# Patient Record
Sex: MALE | Race: BLACK OR AFRICAN AMERICAN | NOT HISPANIC OR LATINO | URBAN - METROPOLITAN AREA
[De-identification: names, ages, dates, MRNs, and addresses within clinical notes are randomized per-mention and may not be internally consistent; named-entity substitution may affect disease eponyms.]

---

## 2018-04-26 ENCOUNTER — EMERGENCY (EMERGENCY)
Facility: HOSPITAL | Age: 63
LOS: 0 days | Discharge: HOME | End: 2018-04-26
Attending: EMERGENCY MEDICINE
Payer: COMMERCIAL

## 2018-04-26 VITALS
DIASTOLIC BLOOD PRESSURE: 73 MMHG | TEMPERATURE: 98 F | SYSTOLIC BLOOD PRESSURE: 130 MMHG | HEART RATE: 93 BPM | OXYGEN SATURATION: 99 % | RESPIRATION RATE: 19 BRPM

## 2018-04-26 VITALS
HEART RATE: 72 BPM | OXYGEN SATURATION: 95 % | DIASTOLIC BLOOD PRESSURE: 55 MMHG | TEMPERATURE: 97 F | RESPIRATION RATE: 17 BRPM | SYSTOLIC BLOOD PRESSURE: 122 MMHG

## 2018-04-26 DIAGNOSIS — Z96.653 PRESENCE OF ARTIFICIAL KNEE JOINT, BILATERAL: Chronic | ICD-10-CM

## 2018-04-26 PROCEDURE — 99283 EMERGENCY DEPT VISIT LOW MDM: CPT

## 2018-04-26 PROCEDURE — 72100 X-RAY EXAM L-S SPINE 2/3 VWS: CPT | Mod: 26

## 2018-04-26 PROCEDURE — 73562 X-RAY EXAM OF KNEE 3: CPT | Mod: 26,LT

## 2018-04-26 RX ORDER — OXYCODONE AND ACETAMINOPHEN 5; 325 MG/1; MG/1
2 TABLET ORAL ONCE
Qty: 0 | Refills: 0 | Status: DISCONTINUED | OUTPATIENT
Start: 2018-04-26 | End: 2018-04-26

## 2018-04-26 RX ADMIN — OXYCODONE AND ACETAMINOPHEN 2 TABLET(S): 5; 325 TABLET ORAL at 19:02

## 2018-04-26 NOTE — ED PROVIDER NOTE - PHYSICAL EXAMINATION
Vitals: WNL  Gen: AAOx3, NAD, sitting comfortably in stretcher  Head: ncat, perrla, eomi b/l  Neck: supple, no lymphadenopathy, no midline deviation  Heart: rrr, no m/r/g  Lungs: CTA b/l, no rales/ronchi/wheezes  Abd: soft, nontender, non-distended, no rebound or guarding  Ext: no clubbing/cyanosis/edema, L knee appears grossly normal  Neuro: sensation and muscle strength intact b/l, steady gait

## 2018-04-26 NOTE — ED ADULT NURSE REASSESSMENT NOTE - NS ED NURSE REASSESS COMMENT FT1
Received report on patient from Lalo DAVIES,  knee pain persist 8/10, awaiting relief of pain following percocet
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
## 2018-04-26 NOTE — ED PROVIDER NOTE - PROGRESS NOTE DETAILS
Results reported to patient--grossly benign, XR's have no acute fx/injury  Pt. reports feeling better after meds, straight leg brace dispensed   pt. agrees to f/u with primary care outpt. as well as ortho  pt. understands to return to ED if symptoms worsen; will d/c
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
## 2018-04-27 DIAGNOSIS — M25.562 PAIN IN LEFT KNEE: ICD-10-CM

## 2018-04-27 DIAGNOSIS — M54.5 LOW BACK PAIN: ICD-10-CM

## 2018-04-27 DIAGNOSIS — Z96.652 PRESENCE OF LEFT ARTIFICIAL KNEE JOINT: ICD-10-CM

## 2018-04-27 DIAGNOSIS — M25.561 PAIN IN RIGHT KNEE: ICD-10-CM
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
## 2018-04-27 DIAGNOSIS — I10 ESSENTIAL (PRIMARY) HYPERTENSION: ICD-10-CM
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
## 2019-06-27 ENCOUNTER — RESULT REVIEW (OUTPATIENT)
Age: 64
End: 2019-06-27

## 2019-06-27 ENCOUNTER — OUTPATIENT (OUTPATIENT)
Dept: OUTPATIENT SERVICES | Facility: HOSPITAL | Age: 64
LOS: 1 days | Discharge: HOME | End: 2019-06-27

## 2019-06-27 VITALS
HEART RATE: 84 BPM | HEIGHT: 74 IN | SYSTOLIC BLOOD PRESSURE: 123 MMHG | WEIGHT: 199.96 LBS | OXYGEN SATURATION: 99 % | RESPIRATION RATE: 17 BRPM | TEMPERATURE: 97 F | DIASTOLIC BLOOD PRESSURE: 68 MMHG

## 2019-06-27 VITALS — DIASTOLIC BLOOD PRESSURE: 78 MMHG | HEART RATE: 72 BPM | SYSTOLIC BLOOD PRESSURE: 126 MMHG | RESPIRATION RATE: 16 BRPM

## 2019-06-27 DIAGNOSIS — Z96.653 PRESENCE OF ARTIFICIAL KNEE JOINT, BILATERAL: Chronic | ICD-10-CM

## 2019-06-27 RX ORDER — SODIUM CHLORIDE 9 MG/ML
1000 INJECTION, SOLUTION INTRAVENOUS
Refills: 0 | Status: DISCONTINUED | OUTPATIENT
Start: 2019-06-27 | End: 2019-06-27

## 2019-06-27 RX ADMIN — SODIUM CHLORIDE 75 MILLILITER(S): 9 INJECTION, SOLUTION INTRAVENOUS at 16:25

## 2019-06-27 NOTE — ASU DISCHARGE PLAN (ADULT/PEDIATRIC) - ASU DC SPECIAL INSTRUCTIONSFT
Please call Dr. Turpin's office at  to been seen in a follow up office visit two weeks after the completion of your procedure.    Resume all previous medications

## 2019-06-27 NOTE — ASU PATIENT PROFILE, ADULT - PMH
Chronic leg pain  multiple knee surg. bilateral knee replacements 05/ 2012  Hypertension    Insomnia

## 2019-07-01 LAB
SURGICAL PATHOLOGY STUDY: SIGNIFICANT CHANGE UP
SURGICAL PATHOLOGY STUDY: SIGNIFICANT CHANGE UP

## 2019-07-05 DIAGNOSIS — N18.9 CHRONIC KIDNEY DISEASE, UNSPECIFIED: ICD-10-CM

## 2019-07-05 DIAGNOSIS — Z86.010 PERSONAL HISTORY OF COLONIC POLYPS: ICD-10-CM

## 2019-07-05 DIAGNOSIS — K29.50 UNSPECIFIED CHRONIC GASTRITIS WITHOUT BLEEDING: ICD-10-CM

## 2019-07-05 DIAGNOSIS — R10.9 UNSPECIFIED ABDOMINAL PAIN: ICD-10-CM

## 2019-07-05 DIAGNOSIS — I12.9 HYPERTENSIVE CHRONIC KIDNEY DISEASE WITH STAGE 1 THROUGH STAGE 4 CHRONIC KIDNEY DISEASE, OR UNSPECIFIED CHRONIC KIDNEY DISEASE: ICD-10-CM

## 2019-07-05 DIAGNOSIS — Z12.11 ENCOUNTER FOR SCREENING FOR MALIGNANT NEOPLASM OF COLON: ICD-10-CM

## 2019-07-05 DIAGNOSIS — D12.3 BENIGN NEOPLASM OF TRANSVERSE COLON: ICD-10-CM

## 2019-07-05 DIAGNOSIS — K57.30 DIVERTICULOSIS OF LARGE INTESTINE WITHOUT PERFORATION OR ABSCESS WITHOUT BLEEDING: ICD-10-CM

## 2019-07-05 DIAGNOSIS — Z96.653 PRESENCE OF ARTIFICIAL KNEE JOINT, BILATERAL: ICD-10-CM

## 2019-07-05 DIAGNOSIS — J45.909 UNSPECIFIED ASTHMA, UNCOMPLICATED: ICD-10-CM

## 2019-09-27 ENCOUNTER — EMERGENCY (EMERGENCY)
Facility: HOSPITAL | Age: 64
LOS: 0 days | Discharge: HOME | End: 2019-09-28
Attending: EMERGENCY MEDICINE
Payer: MEDICARE

## 2019-09-27 VITALS
DIASTOLIC BLOOD PRESSURE: 68 MMHG | HEART RATE: 83 BPM | WEIGHT: 199.96 LBS | SYSTOLIC BLOOD PRESSURE: 151 MMHG | RESPIRATION RATE: 20 BRPM | HEIGHT: 74 IN | TEMPERATURE: 98 F | OXYGEN SATURATION: 98 %

## 2019-09-27 DIAGNOSIS — M25.50 PAIN IN UNSPECIFIED JOINT: ICD-10-CM

## 2019-09-27 DIAGNOSIS — M25.562 PAIN IN LEFT KNEE: ICD-10-CM

## 2019-09-27 DIAGNOSIS — I10 ESSENTIAL (PRIMARY) HYPERTENSION: ICD-10-CM

## 2019-09-27 DIAGNOSIS — Z96.653 PRESENCE OF ARTIFICIAL KNEE JOINT, BILATERAL: Chronic | ICD-10-CM

## 2019-09-27 DIAGNOSIS — Z96.651 PRESENCE OF RIGHT ARTIFICIAL KNEE JOINT: ICD-10-CM

## 2019-09-27 PROCEDURE — 99284 EMERGENCY DEPT VISIT MOD MDM: CPT

## 2019-09-27 RX ORDER — FLUTICASONE FUROATE, UMECLIDINIUM BROMIDE AND VILANTEROL TRIFENATATE 200; 62.5; 25 UG/1; UG/1; UG/1
1 POWDER RESPIRATORY (INHALATION)
Qty: 0 | Refills: 0 | DISCHARGE

## 2019-09-27 RX ORDER — VALSARTAN 80 MG/1
0 TABLET ORAL
Qty: 0 | Refills: 0 | DISCHARGE

## 2019-09-27 RX ORDER — AMLODIPINE BESYLATE 2.5 MG/1
1 TABLET ORAL
Qty: 0 | Refills: 0 | DISCHARGE

## 2019-09-27 NOTE — ED ADULT NURSE NOTE - NSIMPLEMENTINTERV_GEN_ALL_ED
Implemented All Universal Safety Interventions:  [place] to call system. Call bell, personal items and telephone within reach. Instruct patient to call for assistance. Room bathroom lighting operational. Non-slip footwear when patient is off stretcher. Physically safe environment: no spills, clutter or unnecessary equipment. Stretcher in lowest position, wheels locked, appropriate side rails in place.

## 2019-09-27 NOTE — ED ADULT TRIAGE NOTE - CHIEF COMPLAINT QUOTE
Pt BIBA, Pt pw L-knee pain. Pt stated he had a couple of knee replacement (3/6/19). tonight he was walking and he felt severe pain to knee.

## 2019-09-27 NOTE — ED ADULT NURSE NOTE - OBJECTIVE STATEMENT
65 y/o male PMHx COPD, HTN b/l Knee replacement x 5. Patient reports L- knee replacement at [facility] 3/6/19 states that he feels like something is "loose or dislocated", States that he felt like he was going to fall today due to the pain. Denies fevers. chills, recent falls

## 2019-09-28 VITALS
RESPIRATION RATE: 18 BRPM | SYSTOLIC BLOOD PRESSURE: 122 MMHG | TEMPERATURE: 98 F | DIASTOLIC BLOOD PRESSURE: 68 MMHG | HEART RATE: 68 BPM | OXYGEN SATURATION: 100 %

## 2019-09-28 PROCEDURE — 73562 X-RAY EXAM OF KNEE 3: CPT | Mod: 26,LT

## 2019-09-28 PROCEDURE — 73700 CT LOWER EXTREMITY W/O DYE: CPT | Mod: 26,LT

## 2019-09-28 RX ORDER — KETOROLAC TROMETHAMINE 30 MG/ML
60 SYRINGE (ML) INJECTION ONCE
Refills: 0 | Status: DISCONTINUED | OUTPATIENT
Start: 2019-09-28 | End: 2019-09-28

## 2019-09-28 RX ADMIN — Medication 60 MILLIGRAM(S): at 02:26

## 2019-09-28 NOTE — ED PROVIDER NOTE - PHYSICAL EXAMINATION
Gen: Alert, NAD  Head: NC, AT, EOMI, normal lids/conjunctiva  ENT: normal hearing, patent oropharynx, MMM  Neck: supple, no tenderness/meningismus, FROM, Trachea midline  Pulm: Bilateral clear BS, normal resp effort, no wheeze/stridor/retractions  CV: RRR, no M/R/G, +dist pulses  Abd: soft, NT/ND, +BS, no guarding/rebound tenderness  Mskel: no edema/erythema/cyanosis, +L knee swollen, pt able to flex & extend, DP+2, sensation intact, +crepitus, motor intact  Skin: no rash  Neuro: no sensory/motor deficits

## 2019-09-28 NOTE — ED PROVIDER NOTE - PATIENT PORTAL LINK FT
You can access the FollowMyHealth Patient Portal offered by [facility] by registering at the following website: [email or web address]. By joining [employer]’s FollowMyHealth portal, you will also be able to view your health information using other applications (apps) compatible with our system.

## 2019-09-28 NOTE — ED PROVIDER NOTE - CLINICAL SUMMARY MEDICAL DECISION MAKING FREE TEXT BOX
Pt w/o mult knee surgeries, no fx on imaging of L knee, noted effusion.  Placed in knee immobilizer, pt tolerated procedure well, remained NV intact.  Given crutches, pt familiar w use.  Dc home to follow up w PMD & ortho, instructed to return to ED if sx worsen.

## 2019-09-28 NOTE — ED PROVIDER NOTE - OBJECTIVE STATEMENT
Pertinent PMH/PSH/FHx/SHx and Review of Systems contained within:    63yo M w PMH of HTN, s/p knee replacement x3 in R knee, and x5 in L knee ([facility]) presents to ED c/o pain in L knee.  Pt states on 9/11/19 he noted a sharp pain in L knee, resolved w/o intervention.  Denies trauma, muscle weakness, paresthesias.  Pt states he was able to ambulate, then yesterday pt noted pain & "crunching" in L knee.  Also reports knee is more swollen.  Denies fever, chills, CP, SOB, abd pain.  Pt took his oxycodone at home w mild relief.    No fever/chills, No photophobia/eye pain/changes in vision, No ear pain/sore throat/dysphagia, No chest pain/palpitations, no SOB/cough/wheeze/stridor, No abdominal pain, No neck/back pain, no rash, no changes in neurological status/function.

## 2019-10-29 ENCOUNTER — APPOINTMENT (OUTPATIENT)
Dept: ORTHOPEDIC SURGERY | Facility: CLINIC | Age: 64
End: 2019-10-29
Payer: COMMERCIAL

## 2019-10-29 VITALS
DIASTOLIC BLOOD PRESSURE: 75 MMHG | BODY MASS INDEX: 25.67 KG/M2 | HEIGHT: 74 IN | HEART RATE: 101 BPM | SYSTOLIC BLOOD PRESSURE: 147 MMHG | WEIGHT: 200 LBS

## 2019-10-29 DIAGNOSIS — M17.10 UNILATERAL PRIMARY OSTEOARTHRITIS, UNSPECIFIED KNEE: ICD-10-CM

## 2019-10-29 DIAGNOSIS — M25.562 PAIN IN LEFT KNEE: ICD-10-CM

## 2019-10-29 PROCEDURE — 73564 X-RAY EXAM KNEE 4 OR MORE: CPT | Mod: LT

## 2019-10-29 PROCEDURE — 99204 OFFICE O/P NEW MOD 45 MIN: CPT

## 2019-10-29 NOTE — REVIEW OF SYSTEMS
[Arthralgia] : arthralgia [Joint Pain] : joint pain [Joint Swelling] : joint swelling [Negative] : Heme/Lymph

## 2019-10-29 NOTE — HISTORY OF PRESENT ILLNESS
[Worsening] : worsening [5] : a minimum pain level of 5/10 [Standing] : standing [Constant] : ~He/She~ states the symptoms seem to be constant [Walking] : worsened by walking [Recumbency] : relieved by recumbency [6] : a maximum pain level of 6/10 [de-identified] : Pt presents for initial visit with  pain in his left knee. Pt states his knee locks and sergio. Hx of TKR bilateral and revisions. Pt had  L TKR 2005, revision By Dr Tyshawn Parham 2009 then again revision 20012 by Dr Guadarrama then third revision  Left knee  3/6/19 by Dr Chiara BARU,  Pt is taking morphine for pain. Pt is using a crutch for support. Pt had courses of physical therapy after his revision 3/19. (R TKR [id number] plus a revision, pt does not recall the date.)  [de-identified] : topical cream and morphine

## 2019-10-29 NOTE — DISCUSSION/SUMMARY
[de-identified] : The patient was advised of his findings and shown his x-rays. The exact etiology of his current symptoms is unclear at this time. His last left knee revision surgery was March of 2019 and it is possible there is a significant degree of quadriceps weakness and deconditioning alone, however concerns for component loosening and possible infection exist. Patient was advised on following up with his index surgeon and undergo further investigational studies such as C. reactive protein sedimentation rate, white cell count as well as diagnostic knee aspiration for culture and sensitivity Gram stain, etc..

## 2019-10-29 NOTE — PHYSICAL EXAM
[de-identified] : The patient ambulates with a unilateral crutch. He has a  left-sided antalgic gait.\par \par Examination of the left knee discloses healed midline incision. There is mild effusion with warmth to the left knee. Patient is pointing to the anterior knee joint region where he is experiencing pain. There is no acute instability on varus valgus anterior or posterior stress. Crepitus is noted to the patellofemoral region. Range of motion is between °. No thigh or calf tenderness\par \par The patient is able to straight leg raise however there is significant quadriceps weakness against resistance\par \par No acute neurovascular deficits to the lower extremity [de-identified] : X-rays taken of the left knee including the thigh and calf disclose hinged long stem total knee implants. The patella appears subluxed. There is resected femoral bone to the cortical metaphyseal junction replaced with augments and a cemented stem. There appears to be lucent zones between the medial and lateral cement bone interface\par The tibial implant as a spline stem with metaphyseal bone with distal stem abutment to the lateral distal cortical bone which is thinning.

## 2019-11-01 NOTE — ED ADULT TRIAGE NOTE - BMI (KG/M2)
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    Please call patient at [phone]   25.7

## 2020-12-11 NOTE — ASU PATIENT PROFILE, ADULT - BRADEN SCORE (IF 18 OR LESS ACTIVATE SKIN INJURY RISK INCREASED GUIDELINE), MLM
Patient provided with discharge instructions and verbalized understanding; discharge paperwork provided, keypad not working (unable to obtain signature). Patient ambulatory out of department with steady gait. 19

## 2021-09-28 ENCOUNTER — OUTPATIENT (OUTPATIENT)
Dept: OUTPATIENT SERVICES | Facility: HOSPITAL | Age: 66
LOS: 1 days | Discharge: HOME | End: 2021-09-28
Payer: MEDICARE

## 2021-09-28 DIAGNOSIS — R60.9 EDEMA, UNSPECIFIED: ICD-10-CM

## 2021-09-28 DIAGNOSIS — Z96.653 PRESENCE OF ARTIFICIAL KNEE JOINT, BILATERAL: Chronic | ICD-10-CM

## 2021-09-28 PROCEDURE — 93306 TTE W/DOPPLER COMPLETE: CPT | Mod: 26

## 2022-05-13 NOTE — ED ADULT NURSE NOTE - GENITOURINARY WDL
CTA of the chest showed AAA -4.5cm -wlll monitor serial ultrasound    Bladder non-tender and non-distended. Urine clear yellow.

## 2022-08-27 ENCOUNTER — EMERGENCY (EMERGENCY)
Facility: HOSPITAL | Age: 67
LOS: 0 days | Discharge: HOME | End: 2022-08-27
Attending: STUDENT IN AN ORGANIZED HEALTH CARE EDUCATION/TRAINING PROGRAM

## 2022-08-27 VITALS
TEMPERATURE: 98 F | OXYGEN SATURATION: 98 % | HEIGHT: 74 IN | DIASTOLIC BLOOD PRESSURE: 63 MMHG | SYSTOLIC BLOOD PRESSURE: 128 MMHG | RESPIRATION RATE: 20 BRPM | WEIGHT: 199.96 LBS | HEART RATE: 94 BPM

## 2022-08-27 VITALS
HEART RATE: 73 BPM | SYSTOLIC BLOOD PRESSURE: 113 MMHG | OXYGEN SATURATION: 99 % | TEMPERATURE: 98 F | RESPIRATION RATE: 16 BRPM | DIASTOLIC BLOOD PRESSURE: 75 MMHG

## 2022-08-27 DIAGNOSIS — M25.511 PAIN IN RIGHT SHOULDER: ICD-10-CM

## 2022-08-27 DIAGNOSIS — Z96.653 PRESENCE OF ARTIFICIAL KNEE JOINT, BILATERAL: Chronic | ICD-10-CM

## 2022-08-27 DIAGNOSIS — M25.562 PAIN IN LEFT KNEE: ICD-10-CM

## 2022-08-27 DIAGNOSIS — I10 ESSENTIAL (PRIMARY) HYPERTENSION: ICD-10-CM

## 2022-08-27 DIAGNOSIS — G89.29 OTHER CHRONIC PAIN: ICD-10-CM

## 2022-08-27 DIAGNOSIS — Z96.651 PRESENCE OF RIGHT ARTIFICIAL KNEE JOINT: ICD-10-CM

## 2022-08-27 PROCEDURE — 73030 X-RAY EXAM OF SHOULDER: CPT | Mod: 26,RT

## 2022-08-27 PROCEDURE — 73590 X-RAY EXAM OF LOWER LEG: CPT | Mod: 26,LT

## 2022-08-27 PROCEDURE — 73552 X-RAY EXAM OF FEMUR 2/>: CPT | Mod: 26,LT

## 2022-08-27 PROCEDURE — 99284 EMERGENCY DEPT VISIT MOD MDM: CPT

## 2022-08-27 PROCEDURE — 73562 X-RAY EXAM OF KNEE 3: CPT | Mod: 26,LT

## 2022-08-27 RX ORDER — ACETAMINOPHEN 500 MG
1 TABLET ORAL
Qty: 42 | Refills: 0
Start: 2022-08-27 | End: 2022-09-09

## 2022-08-27 RX ORDER — ACETAMINOPHEN 500 MG
975 TABLET ORAL ONCE
Refills: 0 | Status: COMPLETED | OUTPATIENT
Start: 2022-08-27 | End: 2022-08-27

## 2022-08-27 RX ORDER — IBUPROFEN 200 MG
400 TABLET ORAL ONCE
Refills: 0 | Status: COMPLETED | OUTPATIENT
Start: 2022-08-27 | End: 2022-08-27

## 2022-08-27 RX ORDER — IBUPROFEN 200 MG
1 TABLET ORAL
Qty: 30 | Refills: 0
Start: 2022-08-27 | End: 2022-09-05

## 2022-08-27 RX ADMIN — Medication 975 MILLIGRAM(S): at 13:48

## 2022-08-27 RX ADMIN — Medication 400 MILLIGRAM(S): at 13:48

## 2022-08-27 NOTE — ED ADULT NURSE NOTE - OBJECTIVE STATEMENT
pt 68y/o male c/c of sharp "shooting" L knee pain, [id number], worsening. pt denies trauma/fall. AAOX4, ambulates with walker at baseline. hx of L knee replacement (dec 2019) and HTN.

## 2022-08-27 NOTE — ED ADULT NURSE NOTE - NSICDXPASTMEDICALHX_GEN_ALL_CORE_FT
PAST MEDICAL HISTORY:  Chronic leg pain multiple knee surg. bilateral knee replacements 05/ 2012    Hypertension     Insomnia

## 2022-08-27 NOTE — ED ADULT NURSE NOTE - NSIMPLEMENTINTERV_GEN_ALL_ED
Implemented All Fall Risk Interventions:  [place] to call system. Call bell, personal items and telephone within reach. Instruct patient to call for assistance. Room bathroom lighting operational. Non-slip footwear when patient is off stretcher. Physically safe environment: no spills, clutter or unnecessary equipment. Stretcher in lowest position, wheels locked, appropriate side rails in place. Provide visual cue, wrist band, yellow gown, etc. Monitor gait and stability. Monitor for mental status changes and reorient to person, place, and time. Review medications for side effects contributing to fall risk. Reinforce activity limits and safety measures with patient and family.

## 2022-08-27 NOTE — ED ADULT TRIAGE NOTE - CHIEF COMPLAINT QUOTE
Left knee pain  with difficulty walking s/p replacement in December 2021, right  upper arm pain x 2 months but getting worse since August.

## 2022-08-27 NOTE — ED PROVIDER NOTE - NSFOLLOWUPINSTRUCTIONS_ED_ALL_ED_FT
You may take tylenol and ibuprofen as needed for pain every 8 hours. Take the ibuprofen with food.     Return to the emergency room:  - you have severe pain  - cannot walk  - new numbness/tingling  - trouble breathing  - chest pain    Follow up with sports medicine within the few weeks.    Shoulder Pain  Many things can cause shoulder pain, including:    - An injury.  - Moving the arm in the same way again and again (overuse).  - Joint pain (arthritis).    Follow these instructions at home:  Take these actions to help with your pain:    Squeeze a soft ball or a foam pad as much as you can. This helps to prevent swelling. It also makes the arm stronger.  Take over-the-counter and prescription medicines only as told by your doctor.  If told, put ice on the area:    Put ice in a plastic bag.  Place a towel between your skin and the bag.  Leave the ice on for 20 minutes, 2–3 times per day. Stop putting on ice if it does not help with the pain.    If you were given a shoulder sling or immobilizer:    Wear it as told.  Remove it to shower or bathe.  Move your arm as little as possible.  Keep your hand moving. This helps prevent swelling.      Contact a doctor if:  Your pain gets worse.  Medicine does not help your pain.  You have new pain in your arm, hand, or fingers.  Get help right away if:  Your arm, hand, or fingers:    Tingle.  Are numb.  Are swollen.  Are painful.  Turn white or blue.    This information is not intended to replace advice given to you by your health care provider. Make sure you discuss any questions you have with your health care provider.

## 2022-08-27 NOTE — ED PROVIDER NOTE - CLINICAL SUMMARY MEDICAL DECISION MAKING FREE TEXT BOX
68 yo M w/ PMH HTN and PSH of bilateral knee replacement c/o 1 month of R shoulder pain and several months of left knee pain. Neurovascular exam normal. Afebrile and well-appearing.  R shoulder may be 2/2 rotator cuff pathology, bursitis, tendonitis. No fracture or dislocation noted on radiographs. Left knee pain is chronic, no acute fracture visualized on xray. After pain medications, pt's pain improved significantly and was able to ambulate without any difficulty.     Plan:  - XR R shoulder  - XR L knee; additional XR of left femur and tib-fib obtained as recommended by orthopedics  - ibuprofen and tylenol  - reassess  - referral to sports medicine for further follow up and to arrange physical therapy

## 2022-08-27 NOTE — ED PROVIDER NOTE - NSFOLLOWUPCLINICS_GEN_ALL_ED_FT
[facility]  Sports Medicine  [address] [place] [address]  Phone: [phone]  Fax:

## 2022-08-27 NOTE — ED PROVIDER NOTE - OBJECTIVE STATEMENT
66 yo M w/ PMH HTN and PSH of bilateral knee replacement c/o 1 month of R shoulder pain and several months of left knee pain. Denies any trauma or falls. Reports having stabbing R shoulder pain radiating to upper arm, worse with cross adduction, intermittent, moderate in severity over past month. Denies any numbness/tingling, weakness or swelling. Also reports having acute on chronic left knee pain and stiffness worse in extension. Normally ambulates with walker. Has tried taking oxycodone 10 mg with no relief. Denies fever/chills, CP, SOB, N/V,D, back pain, or recent illness.

## 2022-08-27 NOTE — ED PROVIDER NOTE - PHYSICAL EXAMINATION
Gen: NAD, WDWN, elderly male  HEENT: NC/AT, conjunctivae clear  CV: RRR, no M/R/G  Pulm: Speaking full sentences, CTAB, no wheezing, rales, or rhonci. Breathing unlabored  Abd: Soft, NT, ND. No rebound, guarding, rigidity. No CVAT.  MSK: R shoulder limited ROM and pain in adduction. +Pain and slight weakness with empty can test on the right. No erythema, swelling over R shoulder. SILT bilateral upper extremities. Radial pulse 2+. Bilateral surgical scars over knees. No erythema or swelling over knees. ROM limited in left knee extension. No TTP over left knee. No pain with internal/external rotation of bilateral hips. SILT BLE. DP 2+ bilaterally. No LE edema. No midline cervical, thoracic, or lumbar spine TTP.  Neuro: A+Ox3, MAEx4  Psych: Cooperative, normal affect

## 2022-08-27 NOTE — ED PROVIDER NOTE - PATIENT PORTAL LINK FT
You can access the FollowMyHealth Patient Portal offered by [facility] by registering at the following website: [email or web address]. By joining [employer]’s FollowMyHealth portal, you will also be able to view your health information using other applications (apps) compatible with our system.

## 2022-08-27 NOTE — ED PROVIDER NOTE - PROGRESS NOTE DETAILS
Reviewed XRay of left knee, tibia-fibula, and femur with orthopedic service who do not visualize any fracture. Patient reports improvement in pain.  Able to ambulate with walker without difficulty.  Was requesting a knee immobilizer, which was not available in the ED at the time.

## 2022-08-27 NOTE — ED ADULT NURSE NOTE - NSICDXPASTSURGICALHX_GEN_ALL_CORE_FT
PAST SURGICAL HISTORY:  S/P total knee arthroplasty, bilateral right knee three surg. left knee four surglast was  2019

## 2022-08-30 ENCOUNTER — APPOINTMENT (OUTPATIENT)
Dept: SPORTS MEDICINE | Facility: CLINIC | Age: 67
End: 2022-08-30

## 2022-08-30 DIAGNOSIS — M25.511 PAIN IN RIGHT SHOULDER: ICD-10-CM

## 2022-08-30 DIAGNOSIS — M75.101 UNSPECIFIED ROTATOR CUFF TEAR OR RUPTURE OF RIGHT SHOULDER, NOT SPECIFIED AS TRAUMATIC: ICD-10-CM

## 2022-08-30 DIAGNOSIS — M75.41 IMPINGEMENT SYNDROME OF RIGHT SHOULDER: ICD-10-CM

## 2022-08-30 DIAGNOSIS — G89.29 PAIN IN RIGHT SHOULDER: ICD-10-CM

## 2022-08-30 DIAGNOSIS — M89.9 DISORDER OF BONE, UNSPECIFIED: ICD-10-CM

## 2022-08-30 DIAGNOSIS — M79.601 PAIN IN RIGHT ARM: ICD-10-CM

## 2022-08-30 PROCEDURE — 99204 OFFICE O/P NEW MOD 45 MIN: CPT

## 2022-08-30 RX ORDER — MELOXICAM 7.5 MG/1
7.5 TABLET ORAL DAILY
Qty: 30 | Refills: 1 | Status: ACTIVE | COMMUNITY
Start: 2022-08-30 | End: 1900-01-01

## 2022-08-31 NOTE — PHYSICAL EXAM
[de-identified] : General Appearance: Well-nourished, well developed in no acute distress Orientation: Oriented to person, place and time. Lungs: nonlabored breathing, no stridor, no audible wheezing\par \par Shoulder Exam BL shoulder\par \par C spine: full painless ROM, negative spurlings\par Inspection/Palpation UE (R/L): No TTP bilaterally.\par Scapulothoracic motion: No scapular dyskinesis, though scapular compensation noted with abduction of right shoulder.  ROM: Active FF (R/L): 180 / 180, Passive FF (R/L): 180 / 180, Active Abduction (R/L): 170 / 170, Passive Abduction (R/L): 170 / 170, External Rotation at side (R/L): 60 / 60, Internal Rotation (R/L): T12/T12\par Abduction Strength (R/L): 3/5 / 5/5 ER strength (R/L): 5/5 / 5/5 IR strength (R/L): 5/5 / 5/5 Elbow flexion strength (R/L):4/5 / 5/5 Elbow Extension strength (R/L):4/5 / 5/5 Intrinsics of hand strength (R/L): 5/5 / 5/5\par Cross Arm Adduction (R/L): neg / neg\par Belly Press Test (R/L): neg /neg\par Lift off Test (R/L): post /neg Drop arm (R/L): neg/neg Neer Impingement Test (R/L): pos / neg\par Empty Can Test (R/L): pos / neg Hartmann Test (R/L): neg / neg\par [place] Test (R/L): neg / neg Speeds Test (R/L): pos / neg\par O’Panchito’s Test (R/L): neg / neg \par Sulcus Sign (R/L): neg /neg\par \par Mild tenderness with palpation of her right lateral lower humerus, just above lateral epicondyle\par \par Cervical Spine Exam\par Tenderness: no TTP along right/left paraspinals and no TTP in midline of cervical or thoracic spine. ROM: Lateral Flexion (R/L): 45/45, Rotation (R/L): 70/70, Forward Flexion (R/L): 45/45, Extension (R/L): 70/70 DTR UE (R/L): Biceps: (2+/2+); Triceps: (2+/2+)\par Clonus (R/L): - / -\par Sensation: Subjective normal median / ulnar / radial / axillary sensation bilaterally Vasculature: 2+ radial pulse bilaterally\par C5 (deltoids / lateral upper arm sensation)= 5/5 : normal C6 (biceps / lateral forearm sensation)= 5/5 : normal C7 (triceps / middle finger sensation)= 5/5 : 2+ : normal C8 (finger flexors / medial forearm sensation)= 5/5 : normal T1 (finger abductors / medial elbow ankle sensation)= 5/5 : 2+ : normal\par Spurling Test (R/L): neg/neg\par \par \par Elbow Exam (Bilateral)\par \par Inspection/Palpation UE (R/L): No TTP over medial, lateral epicondyle and olecranon, though pain just above right lateral epicondyle. No thenar or hypothenar atrophy \par Elbow ROM (R/L): Flexion 0-140/ 0-140, Supination (R/L): 80/80, Pronation (R/L): 80/80 Elbow Stability (R/L): no varus or valgus laxity bilaterally Elbow Flexion Strength (R/L): 3/5 / 5/5\par Elbow Extension Strength (R/L): 4/5 / 5/5 \par Wrist Extension Strength (R/L): 5/5 / 5/5\par Wrist Flexion Strength (R/L): 5/5 / 5/5 Intrinsics of Hand Strength (R/L): 5/5 / 5/5 Sensation: Subjective normal median, ulnar, radial and axillary sensation bilaterally\par Ulnar distribution numbness with elbow flexion for >30 seconds does not occur\par Tinel’s Sign (R/L): neg /neg\par Maudley’s Test (R/L): pos /neg (resisted middle digit extension)\par Golfer’s elbow Test (R/L): neg /neg (resisted elbow and wrist extension)\par Pain with passive extension of wrist and fingers (R/L): neg /neg Vasculature: 2+ radial pulse bilaterally UE Skin (R/L): no rashes or lesions bilaterally DTR UE (R/L): Biceps (2/2+), Triceps (2/2+) [de-identified] : Bedside US R elbow, R shoulder performed on 8/30/2022 in office:\par Elbow - cortical irregularity at lateral elbow joint line with no evidence of effusion. \par Shoulder -hypoechoic appearance which may be consistent with partial thickness articular tear seen at the supraspinatus. \par \par X-ray of right shoulder, right humerus, right elbow performed in office on 8/30/2022:\par Right shoulder: Mild degenerative changes of the glenohumeral joint.  No fracture or dislocation.\par Right humerus: Atypical appearance of the mid humerus over the lateral aspects with mild bowing of the cortex, and hypodensity noted in the adjacent bone marrow.  No acute fracture.\par Right elbow: Prominent medial epicondyle, no fat pad noted, no fracture or dislocation.\par

## 2022-08-31 NOTE — REVIEW OF SYSTEMS
[Arthralgia] : arthralgia [Fever] : no fever [Chills] : no chills [Feeling Tired] : no fatigue [Recent Weight Gain (___ Lbs)] : no recent ~M [unfilled] weight gain [SOB at rest] : no shortness of breath at rest [SOB on Exertion] : no shortness of breath on exertion [FreeTextEntry9] : right shoulder and right arm pain, progressive [FreeTextEntry5] : no CP [de-identified] : No numbness, tingling, weakness

## 2022-08-31 NOTE — HISTORY OF PRESENT ILLNESS
[de-identified] : 67M pmhx htn, L TKA p/w progressive right arm and shoulder pain for the past 3 months. cannot recall in inciting gesture, reports worsening pain and weakness with raising arm and reaching. Reports using crutches for past 12 years since using crutches, and recently switched to walker 2 wks ago. Has been taking chronic oxycodone he takes for left knee, states it is worth with usage, better at rest. Denies radiating pain, numbness, tingling, paresthesias, neck pain. reports most pain at right lateral arm with rest, worst with elbow flexion and extension. Reports TKA was done due to osteoarthritis, approx 12 yrs ago, had revision done 2-3 yrs ago due to issues with prosthesis.

## 2022-08-31 NOTE — DISCUSSION/SUMMARY
[de-identified] : Attending Attestation:\par \par I saw and evaluated the patient and was involved with and agree with the fellow's history, physical exam, and I personally documented the assessment and plan of care.\par \par ASSESSMENT: \par \par Right-hand-dominant patient with right upper extremity pain, atraumatic, present for 3 months without an inciting event.  \par No neck pain or pain with motion of C-spine; no radiation of symptoms past the mid-to–distal humerus. patient has pain worse with movement of shoulder and elbow.  Patient with right mid humeral pain that is noted both volar and dorsal.   \par \par Right shoulder pain:   X-ray with mild degenerative changes of glenohumeral joint.  Also consider rotator cuff impingement, bursitis, tendinopathy versus partial-thickness tears of supraspinatus and infraspinatus. \par \par Mid humerus pain: Abnormal appearance of mid humerus on x-ray, no systemic symptoms of F/C, weight loss, night sweats. But does have pain worse at night.  Concern for underlying bony malignancy, will obtain urgent MRI with and without contrast to further evaluate, also instructed to follow-up with PMD for further evaluation. Patient has pain over his dorsal compartment with extension against resistance, consistent with triceps tendinopathy/muscle strain.  Also has pain anteriorly with supination and speeds test, concerning for biceps tendinopathy/muscle belly strain. \par \par Lateral elbow pain: Consider lateral epicondylitis.\par \par Hx of L knee TKA with long-stem implant, several revision surgeries, last in 2019 by Dr. Eligio Arciniega ([facility] Orthopedist).  Patient referred back to Dr. Arciniega, but he is requesting for second opinion, he was referred to Dr. Pritchard.\par \par Will treat symptoms with conservative management with activity modification, analgesic medications, HEP/PT, and re-evaluate the patient to see if they would benefit from further diagnostic testing, or referral for injection therapy or surgical intervention.\par \par Clinical and radiographic findings discussed. Diagnosis, prognosis and treatment options reviewed. Patient verbalizes understanding and all questions answered.\par The patient adamant at this time about not having any type of surgical intervention. Wants only to continue with non-operative management. \par \par Patient adamant at this time about not having any type of Sx intervention. Wants only to continue with conservative approach.\par \par PLAN:\par Additional Testing:\par Further Intervention: Patient would like to continue with non-interventional measures\par Weight-bearing Status: WBAT\par Assistive Devices: None\par Therapy: HEP recommended and reviewed\par Health Management: BMI/Weight Management and physical activity level reviewed with the patient\par Home Modalities: RICE protocol for pain/swelling and home modalities reviewed with the patient\par Medications: OTC analgesics\par Orthotics: None\par Work Status: Return to work as tolerated\par Activity Status: Avoid aggravating and high impact activities\par Sports/Gym Status: Gradual return to full activities as tolerated / No gym or sports until cleared medically\par Follow-up: 1 to 2 weeks\par  \par Time Based Billing: \par Total time spent with patient:  50 minutes.\par More than 50% of today's visit was devoted to face-to-face counseling regarding the following:   education and discussion about the diagnosis, prognosis, treatment options, and possible complications of treatments.\par Discussed treatment options.\par Discussed risks and benefits of treatment.\par Instructions covering recommended treatment.\par Detection of adverse events and importance of compliance.\par Discussed prognosis.\par Specific topics discussed included, but were not limited to the following: \par \par Parish Shaffer MD

## 2022-09-13 ENCOUNTER — APPOINTMENT (OUTPATIENT)
Dept: SPORTS MEDICINE | Facility: CLINIC | Age: 67
End: 2022-09-13

## 2022-09-13 DIAGNOSIS — M75.51 BURSITIS OF RIGHT SHOULDER: ICD-10-CM

## 2022-09-13 DIAGNOSIS — M19.019 PRIMARY OSTEOARTHRITIS, UNSPECIFIED SHOULDER: ICD-10-CM

## 2022-09-13 DIAGNOSIS — M75.111 INCOMPLETE ROTATOR CUFF TEAR OR RUPTURE OF RIGHT SHOULDER, NOT SPECIFIED AS TRAUMATIC: ICD-10-CM

## 2022-09-13 PROCEDURE — 99214 OFFICE O/P EST MOD 30 MIN: CPT

## 2022-09-13 NOTE — DISCUSSION/SUMMARY
[de-identified] : Attending Attestation:\par \par Patient seen with Dr. Light.\par I saw and evaluated the patient and was involved with and agree with the fellow's history, physical exam, and I personally documented the assessment and plan of care.\par \par ASSESSMENT: \par \par Right-hand-dominant patient with right upper extremity pain, atraumatic, present for last several months without an inciting event.  \par No neck pain or pain with motion of C-spine; patient has pain worse with movement of shoulder and elbow.  \par \par Right shoulder pain: Concerning for partial-thickness tears of supraspinatus and infraspinatus as well as degenerative changes to the GHJ and ACJ, and degenerative labral tears, and subacromial bursitis.  We will start with HEP and PT and reassess, if not improving consider ultrasound-guided corticosteroid injection versus Prolo/PRP.\par \par Mid humerus pain: Abnormal appearance of mid humerus on x-ray, no systemic symptoms of F/C, weight loss, night sweats. But does have pain worse at night.  Obtained MRI to rule out malignancy, no suspicious enhancement found. Patient has pain over his dorsal compartment with extension against resistance, consistent with triceps tendinopathy/muscle strain as well as pain at the deltoid insertion site.  Also has pain anteriorly with supination and speeds test, concerning for biceps tendinopathy/muscle belly strain. \par \par Lateral elbow pain: Consider lateral epicondylitis.  Recommended HEP, and if not improving plan for formal PT after treatment of right shoulder.  If not improving after this, consider CSI versus PRP injection.\par \par Hx of L knee TKA with long-stem implant, several revision surgeries, causing disability and patient requiring use of walker and crutches, last in 2019 by Dr. Eligio Arciniega ([facility] Orthopedist).  Patient referred back to Dr. Arciniega, but he is requesting for second opinion, he was referred to Dr. Pritchard, has appointment in 1 week.\par \par Will treat symptoms with conservative management with activity modification, analgesic medications, HEP/PT, and re-evaluate the patient to see if they would benefit from further diagnostic testing, or referral for injection therapy or surgical intervention.\par \par Clinical and radiographic findings discussed. Diagnosis, prognosis and treatment options reviewed. Patient verbalizes understanding and all questions answered.\par The patient adamant at this time about not having any type of surgical intervention. Wants only to continue with non-operative management. \par \par Patient adamant at this time about not having any type of Sx intervention. Wants only to continue with conservative approach.\par \par PLAN:\par Additional Testing: none\par Further Intervention: Patient would like to continue with non-interventional measures\par Weight-bearing Status: WBAT\par Assistive Devices: None\par Therapy: HEP recommended and reviewed, PT prescribed\par Health Management: BMI/Weight Management and physical activity level reviewed with the patient\par Home Modalities: RICE protocol for pain/swelling and home modalities reviewed with the patient\par Medications: OTC analgesics\par Orthotics: None\par Work Status: Return to work as tolerated\par Activity Status: Avoid aggravating and high impact activities\par Sports/Gym Status: No gym or sports until cleared medically\par Follow-up: 6 weeks\par  \par Time Based Billing: \par Total time spent with patient:  35 minutes.\par More than 50% of today's visit was devoted to face-to-face counseling regarding the following:   education and discussion about the diagnosis, prognosis, treatment options, and possible complications of treatments.\par Discussed treatment options.\par Discussed risks and benefits of treatment.\par Instructions covering recommended treatment.\par Detection of adverse events and importance of compliance.\par Discussed prognosis.\par \par Parish Shaffer MD

## 2022-09-13 NOTE — REVIEW OF SYSTEMS
[Arthralgia] : arthralgia [Fever] : no fever [Chills] : no chills [Feeling Tired] : no fatigue [Recent Weight Gain (___ Lbs)] : no recent ~M [unfilled] weight gain [SOB at rest] : no shortness of breath at rest [SOB on Exertion] : no shortness of breath on exertion [FreeTextEntry9] : right shoulder and right arm pain, progressive [FreeTextEntry5] : no CP [de-identified] : No numbness, tingling, weakness

## 2022-09-13 NOTE — HISTORY OF PRESENT ILLNESS
[de-identified] : 67M pmhx htn, L TKA p/w progressive right arm and shoulder pain for the past several months. cannot recall in inciting events, reports worsening pain and weakness with raising arm and reaching. Reports using crutches for past 12 years since using crutches, and recently switched to walker 2 wks ago. Has been taking chronic oxycodone he takes for left knee, states it is worse with use, better at rest. Denies radiating pain, numbness, tingling, paresthesias, neck pain. reports most pain at right lateral arm with rest, worst with elbow flexion and extension.  Left TKA approx 12 yrs ago, had revision done 2-3 yrs ago due to issues with prosthesis. \par \par Since the last visit, patient has had no significant improvement in right shoulder pain.  It is mild in severity at this time.

## 2022-09-13 NOTE — PHYSICAL EXAM
[de-identified] : General Appearance: Well-nourished, well developed in no acute distress Orientation: Oriented to person, place and time. Lungs: nonlabored breathing, no stridor, no audible wheezing\par \par Shoulder Exam BL shoulder\par Inspection/Palpation UE (R/L): No TTP bilaterally.\par Scapulothoracic motion: No scapular dyskinesis, though scapular compensation noted with abduction of right shoulder.  ROM: Active FF (R/L): 170 / 180, Passive FF (R/L): 170 / 180, Active Abduction (R/L): 160 / 170, Passive Abduction (R/L): 170 / 170, External Rotation at side (R/L): 60 / 60, Internal Rotation (R/L): T12/T12\par Abduction Strength (R/L): 4+/5 / 5/5 ER strength (R/L): 4+/5 / 5/5 IR strength (R/L): 5/5 / 5/5 Elbow flexion strength (R/L):5/5 / 5/5 Elbow Extension strength (R/L):5/5 / 5/5 Intrinsics of hand strength (R/L): 5/5 / 5/5\par Cross Arm Adduction (R/L): positive / neg\par Belly Press Test (R/L): neg /neg\par Lift off Test (R/L): postiving /neg Drop arm (R/L): neg/neg Neer Impingement Test (R/L): pos / neg\par Empty Can Test (R/L): pos / neg Hartmann Test (R/L): pos / neg\par Yergusons Test (R/L): neg / neg Speeds Test (R/L): neg / neg\par O’Panchito’s Test (R/L): pos / neg \par Sulcus Sign (R/L): neg /neg\par \par Cervical Spine Exam\par Tenderness: no TTP along right/left paraspinals and no TTP in midline of cervical or thoracic spine. ROM: Lateral Flexion (R/L): 45/45, Rotation (R/L): 70/70, Forward Flexion (R/L): 45/45, Extension (R/L): 70/70 DTR UE (R/L): Biceps: (2+/2+); Triceps: (2+/2+)\par Clonus (R/L): - / -\par Sensation: Subjective normal median / ulnar / radial / axillary sensation bilaterally Vasculature: 2+ radial pulse bilaterally\par C5 (deltoids / lateral upper arm sensation)= 5/5 : normal C6 (biceps / lateral forearm sensation)= 5/5 : normal C7 (triceps / middle finger sensation)= 5/5 : 2+ : normal C8 (finger flexors / medial forearm sensation)= 5/5 : normal T1 (finger abductors / medial elbow ankle sensation)= 5/5 : 2+ : normal\par Spurling Test (R/L): neg/neg\par \par \par Elbow Exam (Bilateral)\par Inspection/Palpation UE (R/L): No TTP over medial, lateral epicondyle and olecranon, though pain just above right lateral epicondyle. No thenar or hypothenar atrophy \par Elbow ROM (R/L): Flexion 0-140/ 0-140, Supination (R/L): 80/80, Pronation (R/L): 80/80 Elbow Stability (R/L): no varus or valgus laxity bilaterally \par Ulnar distribution numbness with elbow flexion for >30 seconds does not occur\par Tinel’s Sign (R/L): neg /neg\par Maudley’s Test (R/L): pos /neg (resisted middle digit extension)\par Golfer’s elbow Test (R/L): neg /neg (resisted elbow and wrist extension)\par Pain with passive extension of wrist and fingers (R/L): neg /neg Vasculature: 2+ radial pulse bilaterally UE Skin (R/L): no rashes or lesions bilaterally DTR UE (R/L): Biceps (2/2+), Triceps (2/2+) [de-identified] : MRI of right shoulder and humerus with and without contrast, performed on 9/8 and 9/9/2022 at [facility] radiology:\par Unremarkable MRI of the humerus. No suspicious lesions to suggest the presence of malignancy.\par Partial-thickness tearing of supraspinatus and infraspinatus tendons. Mild fatty infiltration of supraspinatus and infraspinatus muscles. Adjacent degenerative changes at the greater tuberosity.\par Degenerative tearing of the anterior and posterior labrum.\par Degenerative changes of the glenohumeral and acromioclavicular joints.\par Mild subacromial and subdeltoid bursitis. \par No suspicious enhancement to suggest presence of malignancy.\par \par Bedside US R elbow, R shoulder performed on 8/30/2022 in office:\par Elbow - cortical irregularity at lateral elbow joint line with no evidence of effusion. \par Shoulder -hypoechoic appearance which may be consistent with partial thickness articular tear seen at the supraspinatus. \par \par X-ray of right shoulder, right humerus, right elbow performed in office on 8/30/2022:\par Right shoulder: Mild degenerative changes of the glenohumeral joint.  No fracture or dislocation.\par Right humerus: Atypical appearance of the mid humerus over the lateral aspects with mild bowing of the cortex, and hypodensity noted in the adjacent bone marrow.  No acute fracture.\par Right elbow: Prominent medial epicondyle, no fat pad noted, no fracture or dislocation.\par

## 2022-09-20 ENCOUNTER — APPOINTMENT (OUTPATIENT)
Dept: ORTHOPEDIC SURGERY | Facility: CLINIC | Age: 67
End: 2022-09-20

## 2022-09-20 VITALS
HEIGHT: 74 IN | BODY MASS INDEX: 25.67 KG/M2 | HEART RATE: 109 BPM | SYSTOLIC BLOOD PRESSURE: 144 MMHG | DIASTOLIC BLOOD PRESSURE: 73 MMHG | WEIGHT: 200 LBS

## 2022-09-20 PROCEDURE — 99215 OFFICE O/P EST HI 40 MIN: CPT

## 2022-09-20 NOTE — DISCUSSION/SUMMARY
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  I had a prolonged session with the patient regarding the fact that his extensor mechanism is not functional and this particular since he has been having his pump for more than 3 years it is likely that it will be unable to be reconstructed without extensive surgery.  He has not tried a drop lock knee brace yet so I prescribed this for him to see if this will help restore his function for what his level activities desired.  He asks about the option of fusion versus amputation we discussed these options as well.  He should provide me with infection data to show me that the infection is ruled out otherwise we may need to do that.  He will consider his options but for now he will try droplet knee brace.  Several questions sought and answered.

## 2022-09-20 NOTE — PHYSICAL EXAM
[de-identified] : Patient is well nourished, well-developed, in no acute distress, with appropriate mood and affect. The patient is oriented to time, place, and person. Respirations are even and unlabored. Gait evaluation does reveal a limp. There is no inguinal adenopathy. Examination of the contralateral knee shows normal range of motion, strength, no tenderness, and intact skin. The operative limb is well-perfused, has a well appearing surgical scar, and shows a grossly normal motor and sensory examination.  Unable to straight leg raise.  80 degree extensor lag.  Knee motion is painless passively and the [SIDE] knee moves from  passively degrees.  Actively he can only flex the knee to approximate 110 from 90 degrees resting posture.  The knee is stable within that range-of-motion to AP and ML stress. The alignment of the knee is neutral. Muscle strength is normal. Quadriceps and hamstring muscle strength is normal bilaterally. Pedal pulses are palpable.  [de-identified] : The patient brings with him an AP and lateral graphs of the left femur, left knee and left tibia and fibula.  A longstem cemented hinged is noted with radiolucent lines about the femoral component but old radiographs are unavailable for comparison.  There is also patella bone osteolysis and flattening of the patella.

## 2022-09-20 NOTE — HISTORY OF PRESENT ILLNESS
[de-identified] : This is very nice 67-year-old gentleman experiencing left knee pain, which is severe in intensity. The pain substantially limits activities of daily living. Walking tolerance is reduced.  He is a complex medical history.  He underwent a left total knee arthroplasty by surgeon 2005.  This was complicated by some type of failure and underwent 4-5 more revisions since then at [facility].  The last revision was complicated by infection and now status post irrigation debridement January 24, 2020.  He states that the infection was marked up again and is no longer present.  He has not bring the data with him today.  No fevers or chills.  Since his last revision surgery has been unable to extend the knee.  Reviewing his records he has been having quadriceps weakness and unable to extend the knee since 2019.  Ambulates with a walker.  Chronically takes oxycodone.

## 2022-09-20 NOTE — REASON FOR VISIT
[Initial Visit] : an initial visit for [Artificial Knee Joint] : an artificial knee joint [Knee Pain] : knee pain

## 2022-10-18 ENCOUNTER — APPOINTMENT (OUTPATIENT)
Dept: SPORTS MEDICINE | Facility: CLINIC | Age: 67
End: 2022-10-18

## 2022-10-18 PROCEDURE — 20610 DRAIN/INJ JOINT/BURSA W/O US: CPT

## 2022-10-18 PROCEDURE — 99214 OFFICE O/P EST MOD 30 MIN: CPT | Mod: 25

## 2022-10-18 NOTE — PHYSICAL EXAM
[de-identified] : General Appearance: Well-nourished, well developed in no acute distress \par Orientation: Oriented to person, place and time. \par Lungs: nonlabored breathing, no stridor, no audible wheezing\par \par Shoulder Exam BL shoulder\par Inspection/Palpation UE (R/L): TTP to right AC.  and mild ttp to lateral right shoulder. No TTP to left shoulder. \par Scapulothoracic motion: No scapular dyskinesis  \par ROM: Active FF (R/L): 180 / 180, Passive FF (R/L): 180 / 180, Active Abduction (R/L): 170 / 170, Passive Abduction (R/L): 170 / 170, External Rotation at side (R/L): 60 / 60, Internal Rotation (R/L): T12/T12\par Abduction Strength (R/L): 4+/5 / 5/5 ER strength (R/L): 4+/5 / 5/5 IR strength (R/L): 5/5 / 5/5 Elbow flexion strength (R/L):5/5 / 5/5 Elbow Extension strength (R/L):5/5 / 5/5 Intrinsics of hand strength (R/L): 5/5 / 5/5\par Cross Arm Adduction (R/L): positive / neg\par Belly Press Test (R/L): neg /neg\par Lift off Test (R/L): neg /neg \par Drop arm (R/L): neg/neg \par Neer Impingement Test (R/L): neg / neg\par Empty Can Test (R/L): pos / neg Hartmann Test (R/L): neg / neg\par [place]ergusons Test (R/L): neg / neg Speeds Test (R/L): neg / neg\par O’Panchito’s Test (R/L): neg / neg \par Sulcus Sign (R/L): neg /neg\par \par Cervical Spine Exam\par Tenderness: no TTP along right/left paraspinals and no TTP in midline of cervical or thoracic spine. ROM: Lateral Flexion (R/L): 45/45, Rotation (R/L): 70/70, Forward Flexion (R/L): 45/45, Extension (R/L): 70/70 DTR UE (R/L): Biceps: (2+/2+); Triceps: (2+/2+)\par Clonus (R/L): - / -\par Sensation: Subjective normal median / ulnar / radial / axillary sensation bilaterally Vasculature: 2+ radial pulse bilaterally\par C5 (deltoids / lateral upper arm sensation)= 5/5 : normal C6 (biceps / lateral forearm sensation)= 5/5 : normal C7 (triceps / middle finger sensation)= 5/5 : 2+ : normal C8 (finger flexors / medial forearm sensation)= 5/5 : normal T1 (finger abductors / medial elbow ankle sensation)= 5/5 : 2+ : normal\par Spurling Test (R/L): neg/neg\par \par \par Elbow Exam (Bilateral)\par Inspection/Palpation UE (R/L): No TTP over medial, lateral epicondyle and olecranon, though pain just above right lateral epicondyle. No thenar or hypothenar atrophy \par Elbow ROM (R/L): Flexion 0-140/ 0-140, Supination (R/L): 80/80, Pronation (R/L): 80/80 Elbow Stability (R/L): no varus or valgus laxity bilaterally \par Ulnar distribution numbness with elbow flexion for >30 seconds does not occur\par Tinel’s Sign (R/L): neg /neg\par Maudley’s Test (R/L): pos /neg (resisted middle digit extension)\par Golfer’s elbow Test (R/L): neg /neg (resisted elbow and wrist extension)\par Pain with passive extension of wrist and fingers (R/L): neg /neg Vasculature: 2+ radial pulse bilaterally UE Skin (R/L): no rashes or lesions bilaterally DTR UE (R/L): Biceps (2/2+), Triceps (2/2+) [de-identified] : MRI of right shoulder and humerus with and without contrast, performed on 9/8 and 9/9/2022 at [facility] radiology:\par Unremarkable MRI of the humerus. No suspicious lesions to suggest the presence of malignancy.\par Partial-thickness tearing of supraspinatus and infraspinatus tendons. Mild fatty infiltration of supraspinatus and infraspinatus muscles. Adjacent degenerative changes at the greater tuberosity.\par Degenerative tearing of the anterior and posterior labrum.\par Degenerative changes of the glenohumeral and acromioclavicular joints.\par Mild subacromial and subdeltoid bursitis. \par No suspicious enhancement to suggest presence of malignancy.\par \par Bedside US R elbow, R shoulder performed on 8/30/2022 in office:\par Elbow - cortical irregularity at lateral elbow joint line with no evidence of effusion. \par Shoulder -hypoechoic appearance which may be consistent with partial thickness articular tear seen at the supraspinatus. \par \par X-ray of right shoulder, right humerus, right elbow performed in office on 8/30/2022:\par Right shoulder: Mild degenerative changes of the glenohumeral joint.  No fracture or dislocation.\par Right humerus: Atypical appearance of the mid humerus over the lateral aspects with mild bowing of the cortex, and hypodensity noted in the adjacent bone marrow.  No acute fracture.\par Right elbow: Prominent medial epicondyle, no fat pad noted, no fracture or dislocation.\par

## 2022-10-18 NOTE — REVIEW OF SYSTEMS
[Arthralgia] : arthralgia [Fever] : no fever [Chills] : no chills [Feeling Tired] : no fatigue [Recent Weight Gain (___ Lbs)] : no recent ~M [unfilled] weight gain [SOB at rest] : no shortness of breath at rest [SOB on Exertion] : no shortness of breath on exertion [FreeTextEntry9] : right shoulder and right arm pain, progressive [FreeTextEntry5] : no CP [de-identified] : No numbness, tingling, weakness

## 2022-10-18 NOTE — DISCUSSION/SUMMARY
[de-identified] : Attending Attestation:\par \par Seen with Dr. Light\par I saw and evaluated the patient and was involved with and agree with the fellow's history, physical exam, and I personally documented the assessment and plan of care.\par \par Right anterior and lateral shoulder pain that has been chronic, atraumatic.  Concerning for osteoarthritis involving the glenohumeral and AC joint.  Also has a component of SASD bursitis and rotator cuff tendinopathy with partial-thickness tears.  Patient with persistent pain despite conservative therapy, would prefer to have CSI performed in office today.\par \par Physical exam suggesting patient has AC joint pain chronic arthritis, ultrasound showed chronic tears in the patient's rotator cuff muscles,.  Injections discussed with patient into the AC joint as well as the glenohumeral joint and the SASD bursa, patient agreed, was provided with injections with reported relief of symptoms.  Was advised to return in 4 to 6 weeks for reassessment patient agreed to continue with his PT and was discharged stable condition.\par \par PLAN:\par Additional Testing: none\par Further Intervention: Patient would like to continue with non-operative interventions including CSI\par Weight-bearing Status: WBAT\par Assistive Devices: walker\par Therapy: HEP recommended and reviewed, PT prescribed\par Health Management: BMI/Weight Management and physical activity level reviewed with the patient\par Home Modalities: RICE protocol for pain/swelling and home modalities reviewed with the patient\par Medications: OTC analgesics\par Orthotics: None\par Work Status: Return to work as tolerated\par Activity Status: Avoid aggravating and high impact activities\par Sports/Gym Status: No gym or sports until cleared medically\par Follow-up: 6 weeks\par  \par Time Based Billing: \par Total time spent with patient:  35 minutes.\par More than 50% of today's visit was devoted to face-to-face counseling regarding the following:   education and discussion about the diagnosis, prognosis, treatment options, and possible complications of treatments.\par Discussed treatment options.\par Discussed risks and benefits of treatment.\par Instructions covering recommended treatment.\par Detection of adverse events and importance of compliance.\par Discussed prognosis.\par  \par Parish Shaffer MD

## 2022-10-18 NOTE — PROCEDURE
[Injection] : Injection [Right] : of the right [AC Joint] : acromioclavicular joint [Subacromial Bursa] : subacromial bursa [GH Joint] : glenohumeral joint [Joint Pain] : joint pain [Osteoarthritis] : osteoarthritis [Bleeding] : bleeding [Infection] : infection [Allergic Reaction] : allergic reaction [Patient] : patient [Risk] : risk [Benefits] : benefits [Alternatives] : alternatives [Verbal Consent Obtained] : verbal consent was obtained prior to the procedure [Alcohol] : alcohol [Betadine] : betadine [Ethyl Chloride Spray] : ethyl chloride spray was used as a topical anesthetic [Ultrasound Guided] : The procedure was ultrasound guided.   [Bandage Applied] : a bandage [Tolerated Well] : The patient tolerated the procedure well [None] : none [de-identified] : US right shoulder: anechoic areas seen in rotator cuff beds suggesting possible tears. AC joint showing osteoarthritic changes. \par \par Steroid injection of the right AC joint, right glenohumeral joint and right SASD bursa: \par \par After prepping specified areas with alcohol and iodine, lidocaine 1% w/o epi mixed with depo-medrol was injected into said areas in the following mixtures:\par \par AC - 1cc lidocaine, .3cc depomedrol\par GHJ - 3.5 cc lidocaine, .3cc depomedrol\par SASD - 2.5 cc lidocaine, .3cc depo-medrol\par \par Pt tolerated the procedure well.      \par

## 2022-10-18 NOTE — HISTORY OF PRESENT ILLNESS
[de-identified] : 67M pmhx htn, L TKA p/w progressive right arm and shoulder pain for the past several months coming in for a return visit for reevaluation of right arm and shoulder following several weeks of PT.  Patient reports that he has had significant improvements in pain and range of motion of right shoulder since beginning PT, though he reports that he still has some remnant anterior shoulder pain.  Denies any continued pain in the humerus as he mentioned last night.  Denies any numbness, tingling, paresthesias.  Has been taking his chronic Percocet for his leg pain.  Is currently seeing Dr. Pritchard and his prior Orthopedist for his left knee issues.  Has not been taking any Mobic or any other pain medications.  He states that the shoulder pain is worse when he lies on that side when he sleeps.\par \par Since the last visit, patient has had significant improvement in the right shoulder.

## 2022-11-29 ENCOUNTER — APPOINTMENT (OUTPATIENT)
Dept: SPORTS MEDICINE | Facility: CLINIC | Age: 67
End: 2022-11-29

## 2022-11-29 PROCEDURE — 99213 OFFICE O/P EST LOW 20 MIN: CPT

## 2022-11-29 NOTE — HISTORY OF PRESENT ILLNESS
[de-identified] : 67M pmhx htn, L TKA p/w progressive right arm and shoulder pain for the past several months coming in for a return visit for reevaluation of right arm and shoulder following several weeks of PT.  Patient reports that he has had significant improvements in pain and range of motion of right shoulder since beginning PT, though he reports that he still has some remnant anterior shoulder pain.   Denies any numbness, tingling, paresthesias.  Has been taking his chronic Percocet for his leg pain.  Is currently seeing Dr. Pritchard and his prior Orthopedist for his left knee issues.  Has been taking intermittent Mobic or any other pain medications.  States that he has 4 sessions of PT left, and is requesting to have more to improve his shoulder pain.

## 2022-11-29 NOTE — PROCEDURE
[Injection] : Injection [Right] : of the right [AC Joint] : acromioclavicular joint [Subacromial Bursa] : subacromial bursa [GH Joint] : glenohumeral joint [Joint Pain] : joint pain [Osteoarthritis] : osteoarthritis [Bleeding] : bleeding [Infection] : infection [Allergic Reaction] : allergic reaction [Patient] : patient [Risk] : risk [Benefits] : benefits [Alternatives] : alternatives [Verbal Consent Obtained] : verbal consent was obtained prior to the procedure [Alcohol] : alcohol [Betadine] : betadine [Ethyl Chloride Spray] : ethyl chloride spray was used as a topical anesthetic [Ultrasound Guided] : The procedure was ultrasound guided.   [Bandage Applied] : a bandage [Tolerated Well] : The patient tolerated the procedure well [None] : none [de-identified] : US right shoulder: anechoic areas seen in rotator cuff beds suggesting possible tears. AC joint showing osteoarthritic changes. \par \par Steroid injection of the right AC joint, right glenohumeral joint and right SASD bursa: \par \par After prepping specified areas with alcohol and iodine, lidocaine 1% w/o epi mixed with depo-medrol was injected into said areas in the following mixtures:\par \par AC - 1cc lidocaine, .3cc depomedrol\par GHJ - 3.5 cc lidocaine, .3cc depomedrol\par SASD - 2.5 cc lidocaine, .3cc depo-medrol\par \par Pt tolerated the procedure well.      \par

## 2022-11-29 NOTE — REVIEW OF SYSTEMS
[Arthralgia] : arthralgia [Fever] : no fever [Chills] : no chills [Feeling Tired] : no fatigue [Recent Weight Gain (___ Lbs)] : no recent ~M [unfilled] weight gain [SOB at rest] : no shortness of breath at rest [SOB on Exertion] : no shortness of breath on exertion [FreeTextEntry9] : right shoulder and right arm pain, progressive [FreeTextEntry5] : no CP [de-identified] : No numbness, tingling, weakness

## 2022-11-29 NOTE — DISCUSSION/SUMMARY
[de-identified] : Attending Attestation:\par \par Seen with Dr. Light\par I saw and evaluated the patient and was involved with and agree with the fellow's history, physical exam, and I personally documented the assessment and plan of care.\par \par Right anterior and lateral shoulder pain that has been chronic, atraumatic.  Concerning for osteoarthritis involving the glenohumeral and AC joint.  Also has a component of SASD bursitis and rotator cuff tendinopathy with partial-thickness tears. \par \par Physical exam suggesting patient has AC joint pain chronic arthritis, ultrasound showed chronic tears in the patient's rotator cuff muscles. \par \par -> 10/18/2022: CSI to the R AC joint, GHJ and SASD bursa\par \par Patient with continued improvement after CSI and PT. will continue with physical therapy and home exercises.\par \par Will treat symptoms with conservative management with activity modification, analgesic medications, HEP/PT, and re-evaluate the patient to see if they would benefit from further diagnostic testing, or referral for injection therapy or surgical intervention.\par \par Clinical and radiographic findings discussed. Diagnosis, prognosis and treatment options reviewed. Patient verbalizes understanding and all questions answered.\par The patient adamant at this time about not having any type of surgical intervention. Wants only to continue with non-operative management. \par \par Patient adamant at this time about not having any type of Sx intervention. Wants only to continue with conservative approach.\par \par PLAN:\par Additional Testing: none\par Further Intervention: Patient would like to continue with non-operative interventions including CSI\par Weight-bearing Status: WBAT\par Assistive Devices: walker\par Therapy: HEP recommended and reviewed, PT \par Health Management: BMI/Weight Management and physical activity level reviewed with the patient\par Home Modalities: RICE protocol for pain/swelling and home modalities reviewed with the patient\par Medications: OTC analgesics\par Orthotics: Patient utilizes hinged knee brace for left knee as prescribed by his joint replacement specialist\par Work Status: Return to work as tolerated\par Activity Status: Avoid aggravating and high impact activities\par Sports/Gym Status: No gym or sports until cleared medically\par Follow-up: 2 months\par \par  \par Parish Shaffer MD\par \par

## 2022-11-29 NOTE — PHYSICAL EXAM
[de-identified] : General Appearance: Well-nourished, well developed in no acute distress \par Orientation: Oriented to person, place and time. \par Lungs: nonlabored breathing, no stridor, no audible wheezing\par \par Shoulder Exam BL shoulder\par Inspection/Palpation UE (R/L): Mild TTP to right AC.  and mild ttp to lateral right shoulder. No TTP to left shoulder. \par Scapulothoracic motion: No scapular dyskinesis  \par ROM: Active FF (R/L): 180 / 180, Passive FF (R/L): 180 / 180, Active Abduction (R/L): 170 / 170, Passive Abduction (R/L): 170 / 170, External Rotation at side (R/L): 60 / 60, Internal Rotation (R/L): T12/T12\par Abduction Strength (R/L): 4+/5 / 5/5 ER strength (R/L): 4+/5 / 5/5 IR strength (R/L): 5/5 / 5/5 Elbow flexion strength (R/L):5/5 / 5/5 Elbow Extension strength (R/L):5/5 / 5/5 Intrinsics of hand strength (R/L): 5/5 / 5/5\par Cross Arm Adduction (R/L): positive / neg\par Belly Press Test (R/L): neg /neg\par Lift off Test (R/L): neg /neg \par Drop arm (R/L): neg/neg \par Neer Impingement Test (R/L): neg / neg\par Empty Can Test (R/L): pos / neg \par Hartmann Test (R/L): pos / neg\par Yergusons Test (R/L): neg / neg \par Speeds Test (R/L): neg / neg\par O’Panchito’s Test (R/L): pos / neg \par Sulcus Sign (R/L): neg /neg\par \par Cervical Spine Exam\par Tenderness: no TTP along right/left paraspinals and no TTP in midline of cervical or thoracic spine. ROM: Lateral Flexion (R/L): 45/45, Rotation (R/L): 70/70, Forward Flexion (R/L): 45/45, Extension (R/L): 70/70 DTR UE (R/L): Biceps: (2+/2+); Triceps: (2+/2+)\par Clonus (R/L): - / -\par Sensation: Subjective normal median / ulnar / radial / axillary sensation bilaterally Vasculature: 2+ radial pulse bilaterally\par C5 (deltoids / lateral upper arm sensation)= 5/5 : normal C6 (biceps / lateral forearm sensation)= 5/5 : normal C7 (triceps / middle finger sensation)= 5/5 : 2+ : normal C8 (finger flexors / medial forearm sensation)= 5/5 : normal T1 (finger abductors / medial elbow ankle sensation)= 5/5 : 2+ : normal\par Spurling Test (R/L): neg/neg\par \par \par Elbow Exam (Bilateral)\par Inspection/Palpation UE (R/L): No TTP over medial, lateral epicondyle and olecranon, though pain just above right lateral epicondyle. No thenar or hypothenar atrophy \par Elbow ROM (R/L): Flexion 0-140/ 0-140, Supination (R/L): 80/80, Pronation (R/L): 80/80 Elbow Stability (R/L): no varus or valgus laxity bilaterally \par Ulnar distribution numbness with elbow flexion for >30 seconds does not occur\par Tinel’s Sign (R/L): neg /neg\par Maudley’s Test (R/L): pos /neg (resisted middle digit extension)\par Golfer’s elbow Test (R/L): neg /neg (resisted elbow and wrist extension)\par Pain with passive extension of wrist and fingers (R/L): neg /neg Vasculature: 2+ radial pulse bilaterally UE Skin (R/L): no rashes or lesions bilaterally DTR UE (R/L): Biceps (2/2+), Triceps (2/2+) [de-identified] : MRI of right shoulder and humerus with and without contrast, performed on 9/8 and 9/9/2022 at [facility] radiology:\par Unremarkable MRI of the humerus. No suspicious lesions to suggest the presence of malignancy.\par Partial-thickness tearing of supraspinatus and infraspinatus tendons. Mild fatty infiltration of supraspinatus and infraspinatus muscles. Adjacent degenerative changes at the greater tuberosity.\par Degenerative tearing of the anterior and posterior labrum.\par Degenerative changes of the glenohumeral and acromioclavicular joints.\par Mild subacromial and subdeltoid bursitis. \par No suspicious enhancement to suggest presence of malignancy.\par \par Bedside US R elbow, R shoulder performed on 8/30/2022 in office:\par Elbow - cortical irregularity at lateral elbow joint line with no evidence of effusion. \par Shoulder -hypoechoic appearance which may be consistent with partial thickness articular tear seen at the supraspinatus. \par \par X-ray of right shoulder, right humerus, right elbow performed in office on 8/30/2022:\par Right shoulder: Mild degenerative changes of the glenohumeral joint.  No fracture or dislocation.\par Right humerus: Atypical appearance of the mid humerus over the lateral aspects with mild bowing of the cortex, and hypodensity noted in the adjacent bone marrow.  No acute fracture.\par Right elbow: Prominent medial epicondyle, no fat pad noted, no fracture or dislocation.\par

## 2023-09-08 ENCOUNTER — APPOINTMENT (OUTPATIENT)
Dept: PAIN MANAGEMENT | Facility: CLINIC | Age: 68
End: 2023-09-08
Payer: MEDICARE

## 2023-09-08 VITALS — BODY MASS INDEX: 25.67 KG/M2 | WEIGHT: 200 LBS | HEIGHT: 74 IN

## 2023-09-08 PROCEDURE — 99204 OFFICE O/P NEW MOD 45 MIN: CPT

## 2023-09-08 NOTE — DISCUSSION/SUMMARY
[de-identified] : I personally reviewed the MRI/CT scan images and agree with the radiologist's report. The radiological findings were discussed with the patient  pateint s/p multiple tkrs with chronic debilitating pain  proceed with left genicular nerve block under flouro tramadol prn

## 2023-09-08 NOTE — HISTORY OF PRESENT ILLNESS
[Left Leg] : left leg [7] : 7 [Burning] : burning [Radiating] : radiating [Sharp] : sharp [Shooting] : shooting [Stabbing] : stabbing [Throbbing] : throbbing [Tingling] : tingling [Constant] : constant [Household chores] : household chores [Leisure] : leisure [Sleep] : sleep [Social interactions] : social interactions [Meds] : meds [Sitting] : sitting [Standing] : standing [Walking] : walking [] : no [FreeTextEntry1] : LT knee  [FreeTextEntry6] : numbness  [FreeTextEntry9] : oxy

## 2023-09-19 ENCOUNTER — APPOINTMENT (OUTPATIENT)
Age: 68
End: 2023-09-19

## 2023-10-03 ENCOUNTER — APPOINTMENT (OUTPATIENT)
Age: 68
End: 2023-10-03
Payer: MEDICARE

## 2023-10-03 PROCEDURE — 64450 NJX AA&/STRD OTHER PN/BRANCH: CPT | Mod: LT

## 2023-10-06 ENCOUNTER — APPOINTMENT (OUTPATIENT)
Dept: PAIN MANAGEMENT | Facility: CLINIC | Age: 68
End: 2023-10-06

## 2023-10-12 ENCOUNTER — EMERGENCY (EMERGENCY)
Facility: HOSPITAL | Age: 68
LOS: 0 days | Discharge: HOME | End: 2023-10-12
Attending: EMERGENCY MEDICINE
Payer: MEDICARE

## 2023-10-12 VITALS
DIASTOLIC BLOOD PRESSURE: 74 MMHG | OXYGEN SATURATION: 99 % | SYSTOLIC BLOOD PRESSURE: 143 MMHG | HEIGHT: 74 IN | RESPIRATION RATE: 16 BRPM | HEART RATE: 96 BPM | WEIGHT: 199.96 LBS | TEMPERATURE: 98 F

## 2023-10-12 VITALS
SYSTOLIC BLOOD PRESSURE: 148 MMHG | RESPIRATION RATE: 18 BRPM | HEART RATE: 76 BPM | DIASTOLIC BLOOD PRESSURE: 83 MMHG | OXYGEN SATURATION: 96 % | TEMPERATURE: 98 F
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
## 2023-10-12 DIAGNOSIS — T78.40XA ALLERGY, UNSPECIFIED, INITIAL ENCOUNTER: ICD-10-CM

## 2023-10-12 DIAGNOSIS — Z96.653 PRESENCE OF ARTIFICIAL KNEE JOINT, BILATERAL: Chronic | ICD-10-CM

## 2023-10-12 DIAGNOSIS — I10 ESSENTIAL (PRIMARY) HYPERTENSION: ICD-10-CM

## 2023-10-12 DIAGNOSIS — R21 RASH AND OTHER NONSPECIFIC SKIN ERUPTION: ICD-10-CM

## 2023-10-12 DIAGNOSIS — Z96.653 PRESENCE OF ARTIFICIAL KNEE JOINT, BILATERAL: ICD-10-CM

## 2023-10-12 DIAGNOSIS — L50.0 ALLERGIC URTICARIA: ICD-10-CM

## 2023-10-12 LAB
ALBUMIN SERPL ELPH-MCNC: 3.7 G/DL — SIGNIFICANT CHANGE UP (ref 3.3–5)
ALP SERPL-CCNC: 126 U/L — HIGH (ref 40–120)
ALT FLD-CCNC: 56 U/L — SIGNIFICANT CHANGE UP (ref 12–78)
ANION GAP SERPL CALC-SCNC: 3 MMOL/L — LOW (ref 5–17)
ANISOCYTOSIS BLD QL: SIGNIFICANT CHANGE UP
AST SERPL-CCNC: 32 U/L — SIGNIFICANT CHANGE UP (ref 15–37)
BASOPHILS # BLD AUTO: 0.04 K/UL — SIGNIFICANT CHANGE UP (ref 0–0.2)
BASOPHILS NFR BLD AUTO: 0.5 % — SIGNIFICANT CHANGE UP (ref 0–2)
BILIRUB SERPL-MCNC: 0.5 MG/DL — SIGNIFICANT CHANGE UP (ref 0.2–1.2)
BUN SERPL-MCNC: 17 MG/DL — SIGNIFICANT CHANGE UP (ref 7–23)
BURR CELLS BLD QL SMEAR: PRESENT — SIGNIFICANT CHANGE UP
CALCIUM SERPL-MCNC: 8.8 MG/DL — SIGNIFICANT CHANGE UP (ref 8.5–10.1)
CHLORIDE SERPL-SCNC: 109 MMOL/L — HIGH (ref 96–108)
CO2 SERPL-SCNC: 29 MMOL/L — SIGNIFICANT CHANGE UP (ref 22–31)
CREAT SERPL-MCNC: 1.27 MG/DL — SIGNIFICANT CHANGE UP (ref 0.5–1.3)
EGFR: 61 ML/MIN/1.73M2 — SIGNIFICANT CHANGE UP
EOSINOPHIL # BLD AUTO: 0.21 K/UL — SIGNIFICANT CHANGE UP (ref 0–0.5)
EOSINOPHIL NFR BLD AUTO: 2.6 % — SIGNIFICANT CHANGE UP (ref 0–6)
GLUCOSE SERPL-MCNC: 113 MG/DL — HIGH (ref 70–99)
HCT VFR BLD CALC: 47.8 % — SIGNIFICANT CHANGE UP (ref 39–50)
HGB BLD-MCNC: 14.8 G/DL — SIGNIFICANT CHANGE UP (ref 13–17)
HYPOCHROMIA BLD QL: SLIGHT — SIGNIFICANT CHANGE UP
IMM GRANULOCYTES NFR BLD AUTO: 0.6 % — SIGNIFICANT CHANGE UP (ref 0–0.9)
LYMPHOCYTES # BLD AUTO: 2.03 K/UL — SIGNIFICANT CHANGE UP (ref 1–3.3)
LYMPHOCYTES # BLD AUTO: 25.5 % — SIGNIFICANT CHANGE UP (ref 13–44)
MANUAL SMEAR VERIFICATION: SIGNIFICANT CHANGE UP
MCHC RBC-ENTMCNC: 22.9 PG — LOW (ref 27–34)
MCHC RBC-ENTMCNC: 31 G/DL — LOW (ref 32–36)
MCV RBC AUTO: 73.9 FL — LOW (ref 80–100)
MICROCYTES BLD QL: SLIGHT — SIGNIFICANT CHANGE UP
MONOCYTES # BLD AUTO: 0.63 K/UL — SIGNIFICANT CHANGE UP (ref 0–0.9)
MONOCYTES NFR BLD AUTO: 7.9 % — SIGNIFICANT CHANGE UP (ref 2–14)
NEUTROPHILS # BLD AUTO: 5.01 K/UL — SIGNIFICANT CHANGE UP (ref 1.8–7.4)
NEUTROPHILS NFR BLD AUTO: 62.9 % — SIGNIFICANT CHANGE UP (ref 43–77)
NRBC # BLD: 0 /100 WBCS — SIGNIFICANT CHANGE UP (ref 0–0)
OVALOCYTES BLD QL SMEAR: SLIGHT — SIGNIFICANT CHANGE UP
PLAT MORPH BLD: NORMAL — SIGNIFICANT CHANGE UP
PLATELET # BLD AUTO: 306 K/UL — SIGNIFICANT CHANGE UP (ref 150–400)
POIKILOCYTOSIS BLD QL AUTO: SLIGHT — SIGNIFICANT CHANGE UP
POTASSIUM SERPL-MCNC: 4.3 MMOL/L — SIGNIFICANT CHANGE UP (ref 3.5–5.3)
POTASSIUM SERPL-SCNC: 4.3 MMOL/L — SIGNIFICANT CHANGE UP (ref 3.5–5.3)
PROT SERPL-MCNC: 7 GM/DL — SIGNIFICANT CHANGE UP (ref 6–8.3)
RBC # BLD: 6.47 M/UL — HIGH (ref 4.2–5.8)
RBC # FLD: 18.3 % — HIGH (ref 10.3–14.5)
RBC BLD AUTO: ABNORMAL
SODIUM SERPL-SCNC: 141 MMOL/L — SIGNIFICANT CHANGE UP (ref 135–145)
WBC # BLD: 7.97 K/UL — SIGNIFICANT CHANGE UP (ref 3.8–10.5)
WBC # FLD AUTO: 7.97 K/UL — SIGNIFICANT CHANGE UP (ref 3.8–10.5)

## 2023-10-12 PROCEDURE — 99284 EMERGENCY DEPT VISIT MOD MDM: CPT

## 2023-10-12 RX ORDER — DIPHENHYDRAMINE HCL 50 MG
2 CAPSULE ORAL
Qty: 40 | Refills: 0
Start: 2023-10-12 | End: 2023-10-16

## 2023-10-12 RX ORDER — SODIUM CHLORIDE 9 MG/ML
1000 INJECTION INTRAMUSCULAR; INTRAVENOUS; SUBCUTANEOUS ONCE
Refills: 0 | Status: COMPLETED | OUTPATIENT
Start: 2023-10-12 | End: 2023-10-12

## 2023-10-12 RX ORDER — FAMOTIDINE 10 MG/ML
20 INJECTION INTRAVENOUS ONCE
Refills: 0 | Status: COMPLETED | OUTPATIENT
Start: 2023-10-12 | End: 2023-10-12

## 2023-10-12 RX ORDER — DIPHENHYDRAMINE HCL 50 MG
50 CAPSULE ORAL ONCE
Refills: 0 | Status: COMPLETED | OUTPATIENT
Start: 2023-10-12 | End: 2023-10-12

## 2023-10-12 RX ORDER — EPINEPHRINE 0.3 MG/.3ML
0.3 INJECTION INTRAMUSCULAR; SUBCUTANEOUS
Qty: 1 | Refills: 0
Start: 2023-10-12 | End: 2023-10-13

## 2023-10-12 RX ADMIN — SODIUM CHLORIDE 1000 MILLILITER(S): 9 INJECTION INTRAMUSCULAR; INTRAVENOUS; SUBCUTANEOUS at 01:56

## 2023-10-12 RX ADMIN — FAMOTIDINE 20 MILLIGRAM(S): 10 INJECTION INTRAVENOUS at 01:56

## 2023-10-12 RX ADMIN — Medication 50 MILLIGRAM(S): at 01:55

## 2023-10-12 RX ADMIN — Medication 125 MILLIGRAM(S): at 01:56

## 2023-10-12 NOTE — ED PROVIDER NOTE - PHYSICAL EXAMINATION
Vitals: HTN at 143/74  Gen: AAOx3, NAD, sitting comfortably in stretcher  Head: ncat, perrla, eomi b/l  Neck: supple, no lymphadenopathy, no midline deviation  Heart: rrr, no m/r/g  Lungs: CTA b/l, no rales/ronchi/wheezes  Abd: soft, nontender, non-distended, no rebound or guarding  Ext: no clubbing/cyanosis/edema  Neuro: sensation and muscle strength intact b/l, steady gait  derm: diffuse urticarial rash, no broken skin

## 2023-10-12 NOTE — ED PROVIDER NOTE - NSFOLLOWUPCLINICS_GEN_ALL_ED_FT
[facility]  Allergy  [address] [place] [address]  Phone: [phone]  Fax:   Follow Up Time: Urgent

## 2023-10-12 NOTE — ED ADULT TRIAGE NOTE - CHIEF COMPLAINT QUOTE
Pt complains of generalized itching x yesterday. States itching woke him up out his sleep. Also complains of pain to left knee.

## 2023-10-12 NOTE — ED ADULT NURSE NOTE - NSFALLUNIVINTERV_ED_ALL_ED
Bed/Stretcher in lowest position, wheels locked, appropriate side rails in place/Call bell, personal items and telephone in reach/Instruct patient to call for assistance before getting out of bed/chair/stretcher/Non-slip footwear applied when patient is off stretcher/[place] to call system/Physically safe environment - no spills, clutter or unnecessary equipment/Purposeful proactive rounding/Room/bathroom lighting operational, light cord in reach

## 2023-10-12 NOTE — ED PROVIDER NOTE - CLINICAL SUMMARY MEDICAL DECISION MAKING FREE TEXT BOX
67 yo M with urticarial rash, allergic reaction to unknown trigger, no airway distress   -cbc, cmp, hydration, Pepcid, benadryl, Solu-medrol for allergy  -f/u results, reeval

## 2023-10-12 NOTE — ED PROVIDER NOTE - OBJECTIVE STATEMENT
69 yo M with itchy rash since tonight.  Pt. had itching of skin since last night, unknown trigger.  Tonight pt. woke up from sleep and noticed red rash diffusely.  no other complaints, no known inciting event/trigger.  Pt. never had this before.   ROS: negative for fever, cough, headache, chest pain, shortness of breath, abd pain, nausea, vomiting, diarrhea, paresthesia, and weakness--all other systems reviewed are negative.   PMH: HTN and PSH of bilateral knee replacement; Meds: See EMR for list; SH: Denies smoking/drinking/drug use

## 2023-10-12 NOTE — ED ADULT NURSE NOTE - OBJECTIVE STATEMENT
Pt c/o itching for "about 2 days." Redness along arms bilaterally. Pt adds that he has no known allergies, and he did not eat anything unusual or visit any unusual places in the past few days. He adds that he has never experienced this before. Pt denies difficulty breathing or itching in his throat. Denies chest pain, headache, cough, sob, n/v. He adds that "the itching has kept me up at night". Respirations easy and unlabored, on room air.

## 2023-10-12 NOTE — ED PROVIDER NOTE - PROGRESS NOTE DETAILS
Results reported to patient--grossly benign, labs unremarkable   Pt. reports feeling better after meds, rash/itching resolved   pt. agrees to f/u with primary care outpt., allergy referral given   pt. understands to return to ED if symptoms worsen; will d/c with allergy meds, epi pain/instructions performed

## 2023-10-20 ENCOUNTER — APPOINTMENT (OUTPATIENT)
Dept: PAIN MANAGEMENT | Facility: CLINIC | Age: 68
End: 2023-10-20
Payer: MEDICARE

## 2023-10-20 VITALS — BODY MASS INDEX: 25.67 KG/M2 | HEIGHT: 74 IN | WEIGHT: 200 LBS

## 2023-10-20 DIAGNOSIS — Z96.659 PAIN DUE TO INTERNAL ORTHOPEDIC PROSTHETIC DEVICES, IMPLANTS AND GRAFTS, SUBSEQUENT ENCOUNTER: ICD-10-CM

## 2023-10-20 DIAGNOSIS — T84.84XD PAIN DUE TO INTERNAL ORTHOPEDIC PROSTHETIC DEVICES, IMPLANTS AND GRAFTS, SUBSEQUENT ENCOUNTER: ICD-10-CM

## 2023-10-20 PROCEDURE — 99214 OFFICE O/P EST MOD 30 MIN: CPT

## 2023-10-23 ENCOUNTER — APPOINTMENT (OUTPATIENT)
Dept: MRI IMAGING | Facility: CLINIC | Age: 68
End: 2023-10-23
Payer: MEDICARE

## 2023-10-23 DIAGNOSIS — Z96.89 PRESENCE OF OTHER SPECIFIED FUNCTIONAL IMPLANTS: ICD-10-CM

## 2023-10-23 PROCEDURE — 72148 MRI LUMBAR SPINE W/O DYE: CPT | Mod: MH

## 2023-11-29 ENCOUNTER — APPOINTMENT (OUTPATIENT)
Dept: PAIN MANAGEMENT | Facility: CLINIC | Age: 68
End: 2023-11-29
Payer: MEDICARE

## 2023-11-29 PROCEDURE — 99213 OFFICE O/P EST LOW 20 MIN: CPT

## 2023-11-29 RX ORDER — TRAMADOL HYDROCHLORIDE 50 MG/1
50 TABLET, COATED ORAL
Qty: 90 | Refills: 0 | Status: ACTIVE | COMMUNITY
Start: 2023-11-29 | End: 1900-01-01

## 2023-12-05 ENCOUNTER — APPOINTMENT (OUTPATIENT)
Age: 68
End: 2023-12-05

## 2023-12-12 ENCOUNTER — APPOINTMENT (OUTPATIENT)
Age: 68
End: 2023-12-12

## 2023-12-13 ENCOUNTER — APPOINTMENT (OUTPATIENT)
Dept: PAIN MANAGEMENT | Facility: CLINIC | Age: 68
End: 2023-12-13
Payer: MEDICARE

## 2023-12-13 PROCEDURE — 63650Z: CUSTOM

## 2023-12-13 RX ORDER — CEPHALEXIN 500 MG/1
500 CAPSULE ORAL 3 TIMES DAILY
Qty: 21 | Refills: 0 | Status: ACTIVE | COMMUNITY
Start: 2023-12-13 | End: 1900-01-01

## 2023-12-13 NOTE — PROCEDURE
[FreeTextEntry3] : Date of Service: 12/13/2023   Account: [id number]  Patient: NARENDRA ANGELES   YOB: 1955  Age: 68 year   Surgeon:                               Freddie Ozuna M.D.  Pre-Operative Diagnosis:  Lumbosacral Radiculitis  Post Operative Diagnosis:  Lumbosacral Radiculitis    Procedure:                            Dual  Octrode lead spinal cord stimulator trial (Nevro)  Anesthesia:                           Monitor anesthetic care. Local with sedation and nasal oxygen; complications, none   This procedure was carried out using fluoroscopic guidance.  The risks and benefits of the procedure were discussed extensively with the patient including the potential for nerve root irritation or injury as well as possibility of disc space infection.  The patient was given the opportunity for all questions to be answered. The consent of the patient was obtained and the following procedure was performed:   The patient was placed on table in prone position with lumbar support and the area of the thoracic and lumbar spine was prepped and draped in a sterile fashion x3.  Under AP view the  L1-2 interspace was centered and the area just medial to the pedicle on the left and right side at l3-4 were identified and marked. The skin was injected with 1.5% Lidocaine and to which a NEVRO Tuohy needle was advanced using depth direction depth technique until ligament was contacted bilaterally. Under lateral view the needle was advanced until loss was achieved into the epidural space. After this  two  curved single Octrode Nevro lead with 8 contacts were advanced using depth direction depth technique under constant fluoroscopic visualization until one lead was placed to the superior portion of T8 vertebral body  on midline and the second lead was placed at the mid portion of the T9 body on midline.    A nevro stimulator was then affixed to the needles and impedance testing performed and was acceptable. After this the styllete was then removed. The needles were removed under constant fluoroscopic visualization. The leads were then affixed to the the skin using  Tegaderm and gauze. Bacitracin was placed at the entry point of the lead. The patient tolerated the procedure without complications. She was met with the Nevro representative in the recovery area to go over several programs for stimulation. Patient is to take p.o. antibiotics postoperatively for 1 weeks time prophylactically and follow-up in my office in approximately 1 week to evaluate efficacy of 1 week spinal cord stimulator trial and possible consideration of permanent implantation.       The needles were then removed and pressure was applied.  Anesthesia personnel were present throughout the procedure, monitoring vitals which were stable throughout.  Upon completion of the procedure and after medical stabilization the patient was discharged home with instructions.     Freddie Ozuna M.D.

## 2023-12-20 ENCOUNTER — APPOINTMENT (OUTPATIENT)
Dept: PAIN MANAGEMENT | Facility: CLINIC | Age: 68
End: 2023-12-20
Payer: MEDICARE

## 2023-12-20 VITALS — WEIGHT: 200 LBS | HEIGHT: 74 IN | BODY MASS INDEX: 25.67 KG/M2

## 2023-12-20 PROCEDURE — 99214 OFFICE O/P EST MOD 30 MIN: CPT | Mod: 24

## 2024-01-02 NOTE — REASON FOR VISIT
[Follow-Up Visit] : a follow-up pain management visit [FreeTextEntry2] : Dual Octrode lead spinal cord stimulator trial (Elaina)

## 2024-01-02 NOTE — HISTORY OF PRESENT ILLNESS
[Left Leg] : left leg [Burning] : burning [Radiating] : radiating [Sharp] : sharp [Shooting] : shooting [Stabbing] : stabbing [Throbbing] : throbbing [Tingling] : tingling [Constant] : constant [Household chores] : household chores [Leisure] : leisure [Sleep] : sleep [Social interactions] : social interactions [Meds] : meds [Injection therapy] : injection therapy [Nothing helps with pain getting better] : Nothing helps with pain getting better [Sitting] : sitting [Standing] : standing [Walking] : walking [7] : 7 [] : Patient is currently injured and not playing sports: no [FreeTextEntry1] : LT knee  [FreeTextEntry6] : numbness  [FreeTextEntry9] : oxy
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
## 2024-02-09 ENCOUNTER — APPOINTMENT (OUTPATIENT)
Dept: PAIN MANAGEMENT | Facility: CLINIC | Age: 69
End: 2024-02-09
Payer: MEDICARE

## 2024-02-09 VITALS — WEIGHT: 200 LBS | BODY MASS INDEX: 25.67 KG/M2 | HEIGHT: 74 IN

## 2024-02-09 PROCEDURE — 99213 OFFICE O/P EST LOW 20 MIN: CPT

## 2024-02-09 RX ORDER — TRAMADOL HYDROCHLORIDE 100 MG/1
100 TABLET, EXTENDED RELEASE ORAL
Qty: 30 | Refills: 0 | Status: ACTIVE | COMMUNITY
Start: 2024-02-09 | End: 1900-01-01

## 2024-02-09 NOTE — HISTORY OF PRESENT ILLNESS
[Left Leg] : left leg [7] : 7 [Burning] : burning [Radiating] : radiating [Sharp] : sharp [Shooting] : shooting [Stabbing] : stabbing [Throbbing] : throbbing [Tingling] : tingling [Constant] : constant [Household chores] : household chores [Leisure] : leisure [Sleep] : sleep [Social interactions] : social interactions [Meds] : meds [Injection therapy] : injection therapy [Nothing helps with pain getting better] : Nothing helps with pain getting better [Sitting] : sitting [Standing] : standing [Walking] : walking [] : Patient is currently injured and not playing sports: no [FreeTextEntry1] : LT knee  [FreeTextEntry6] : numbness  [FreeTextEntry9] : oxy

## 2024-03-13 PROBLEM — M25.561 RIGHT KNEE PAIN: Status: ACTIVE | Noted: 2024-03-13

## 2024-03-14 ENCOUNTER — APPOINTMENT (OUTPATIENT)
Dept: ORTHOPEDIC SURGERY | Facility: CLINIC | Age: 69
End: 2024-03-14
Payer: MEDICARE

## 2024-03-14 VITALS
HEART RATE: 87 BPM | DIASTOLIC BLOOD PRESSURE: 75 MMHG | WEIGHT: 200 LBS | OXYGEN SATURATION: 97 % | SYSTOLIC BLOOD PRESSURE: 131 MMHG | BODY MASS INDEX: 25.67 KG/M2 | HEIGHT: 74 IN

## 2024-03-14 DIAGNOSIS — T84.84XA PAIN DUE TO INTERNAL ORTHOPEDIC PROSTHETIC DEVICES, IMPLANTS AND GRAFTS, INITIAL ENCOUNTER: ICD-10-CM

## 2024-03-14 DIAGNOSIS — M25.561 PAIN IN RIGHT KNEE: ICD-10-CM

## 2024-03-14 DIAGNOSIS — Z96.659 PAIN DUE TO INTERNAL ORTHOPEDIC PROSTHETIC DEVICES, IMPLANTS AND GRAFTS, INITIAL ENCOUNTER: ICD-10-CM

## 2024-03-14 PROCEDURE — 73562 X-RAY EXAM OF KNEE 3: CPT | Mod: 50

## 2024-03-14 PROCEDURE — 99213 OFFICE O/P EST LOW 20 MIN: CPT

## 2024-03-14 PROCEDURE — 72170 X-RAY EXAM OF PELVIS: CPT

## 2024-03-15 ENCOUNTER — APPOINTMENT (OUTPATIENT)
Dept: PAIN MANAGEMENT | Facility: CLINIC | Age: 69
End: 2024-03-15
Payer: MEDICARE

## 2024-03-15 VITALS — WEIGHT: 200 LBS | BODY MASS INDEX: 25.67 KG/M2 | HEIGHT: 74 IN

## 2024-03-15 DIAGNOSIS — Z96.652 PAIN DUE TO INTERNAL ORTHOPEDIC PROSTHETIC DEVICES, IMPLANTS AND GRAFTS, INITIAL ENCOUNTER: ICD-10-CM

## 2024-03-15 DIAGNOSIS — T84.84XA PAIN DUE TO INTERNAL ORTHOPEDIC PROSTHETIC DEVICES, IMPLANTS AND GRAFTS, INITIAL ENCOUNTER: ICD-10-CM

## 2024-03-15 PROCEDURE — 99214 OFFICE O/P EST MOD 30 MIN: CPT

## 2024-03-15 RX ORDER — TRAMADOL HYDROCHLORIDE 50 MG/1
50 TABLET, COATED ORAL
Qty: 120 | Refills: 0 | Status: ACTIVE | COMMUNITY
Start: 2023-09-08 | End: 1900-01-01

## 2024-03-15 NOTE — HISTORY OF PRESENT ILLNESS
[Left Leg] : left leg [7] : 7 [Burning] : burning [Radiating] : radiating [Sharp] : sharp [Shooting] : shooting [Stabbing] : stabbing [Tingling] : tingling [Throbbing] : throbbing [Constant] : constant [Household chores] : household chores [Leisure] : leisure [Social interactions] : social interactions [Sleep] : sleep [Meds] : meds [Injection therapy] : injection therapy [Sitting] : sitting [Nothing helps with pain getting better] : Nothing helps with pain getting better [Standing] : standing [Walking] : walking [] : Patient is currently injured and not playing sports: no [FreeTextEntry1] : LT knee  [FreeTextEntry6] : numbness  [FreeTextEntry9] : oxy

## 2024-03-15 NOTE — DISCUSSION/SUMMARY
[de-identified] : pt never received ultram er   will send 50mg qid states that he takes 2 pills at a time, sometimes needs twice daily    Medication Renewal  The patient is stable on current pain medication with analgesia and without notable side effects or any obvious aberrant behaviors exhibited.  There is clinically meaningful improvement in pain and function that outweighs risks to patient safety. I have discussed risks and realistic benefits of opioid therapy and patient and clinician responsibilities for managing therapy. Pain agreement has been signed.  Will renew medication today.  Follow up in 4-6 weeks or sooner if there are any problems.  Conservative Care Continue Home exercises, stretching, activity modification, physical therapy, and conservative care.   NO NEW FINDINGS Patient denies new numbness, tingling, weakness, fevers, chills and bowel/bladder incontinence    I have consulted the  Registry for the purpose of reviewing the patient's controlled substance  UTOX DONE-- last dose tramadol this morning

## 2024-03-17 PROBLEM — T84.84XA PAIN IN KNEE REGION AFTER TOTAL KNEE REPLACEMENT: Status: ACTIVE | Noted: 2023-10-20

## 2024-03-17 NOTE — DISCUSSION/SUMMARY
[de-identified] : Thai Granados is a 69-year-old male who presented to the office for evaluation of his right knee popliteal cyst.  Patient and incidentally found popliteal cyst on ultrasound duplex. X-rays showed right revision total knee arthroplasty with migration of the tibial stem and remodeling of the lateral tibia.  Left knee x-rays showed a distal femoral replacement with lucencies around the cement mantle of the femur.  Discussed with the patient the examination and imaging findings.  Discussed with the patient the management of his right total knee arthroplasty at this time.  Patient does not have tibial pain.  Discussed continuing to monitor tibial stem.  Discussed management of patient's left total knee arthroplasty at this time.  Patient was given a prescription for a drop lock brace.  Patient would like to consider operative management.  Discussed that any operative management would consist of potential total femur replacement versus amputation.  ESR and CRP were ordered.  Patient would like to consider options, including amputation.  Patient will follow-up in 1 month for reevaluation and management.  Patient understanding and in agreement with the plan.  All questions answered.  Plan: -Follow up ESR and CRP -Drop Lock Brace Left Lower Extremity -Follow up in 1 month for reevaluation and management

## 2024-03-17 NOTE — PHYSICAL EXAM
[de-identified] : Constitutional:  69 year old male, alert and oriented, cooperative, in no acute distress.  HEENT  NC/AT.  Appearance: symmetric  Chest/Respiratory  Respiratory effort: no intercostal retractions or use of accessory muscles. Nonlabored Breathing  Mental Status:  Judgment, insight: intact Orientation: oriented to time, place, and person  Left Knee  Inspection:     Incision well healed. No erythema or drainage     No Effusion     Mild tenderness over the medial joint line. No tenderness over the femur. No pain with hip ROM      Unable to SLR, No quadriceps function  Range of Motion: 	Extension - -25 degrees 	Flexion - 95 degrees 	Extensor lag: None  Stability:      Demonstrates no Varus or Valgus instability      Negative Anterior or Posterior drawer.      No midflexion instability  Patella: stable, tracks well.   Right Knee  Inspection:     Incision well healed. No erythema or drainage     No Effusion     Non-tender to palpation over tibial tubercle, patella, medial and lateral joint line, and pes insertion. No tenderness over the tibia  Range of Motion: 	Extension - 0 degrees 	Flexion - 120 degrees 	Extensor lag: None  Stability:      Demonstrates no Varus or Valgus instability      Negative Anterior or Posterior drawer.      No midflexion instability  Patella: stable, tracks well.   Neurologic Exam     Motor intact including 5/5 Extensor Hallucis Longus, 5/5 Flexor Hallucis Longus, 5/5 Tibialis Anterior and 5/5 Gastrocnemius     Sensation Intact to Light Touch including Saphenous, Sural, Superficial Peroneal, Deep Peroneal, Tibial nerve distributions  Vascular Exam     Foot is warm and well perfused with 2+ Dorsalis Pedis Pulse   No pain with range of motion of the bilateral hips. No lumbar paraspinal muscle tenderness. [de-identified] : XRay:  XRays of the Pelvis (1 View) taken in the office today and discussed with the patient. XRays demonstrate no obvious fracture or dislocation. There is no significant evidence of osteoarthritis or osteophyte formation. Proximal aspect of left distal femur replacement is partially visualized. (my personal interpretation).   XRay:  XRays of the Right Knee (3 Views) taken in the office today and reviewed with the patient. XRays demonstrate a Right Total Knee Arthroplasty. There are tibial and femoral stemmed components. The tibial stem has migrated laterally with cortical thinning and remodeling on the lateral tibia. (my personal interpretation)  XRay:  XRays of the Left Knee (3 Views) taken in the office today and reviewed with the patient. XRays demonstrate a Left Distal femur replacement. There are lucencies around the cement mantle of the femur. There is cortical thinning over the medial aspect of the femur.    EXAM: BILATERAL LOWER EXTREMITY DUPLEX ULTRASOUND  HISTORY:  Left knee pain and swelling for 3 weeks. Recent bilateral knee replacement  TECHNIQUE: Grayscale, doppler spectral analysis, and color flow doppler imaging  were performed from the level of the common femoral veins  to the popliteal veins bilaterally. Augmentation and compression were utilized as well.  Segmental evaluation of the calf veins was performed.   COMPARISON: 11/15/2017  FINDINGS:   RIGHT:  Common femoral vein: Patent Femoral vein: Patent Popliteal Vein: Patent Calf veins: Evaluation of the posterior tibial and peroneal veins within the calf demonstrate no evidence for venous thrombosis.  LEFT:  Common femoral vein: Patent Femoral vein: Patent Popliteal Vein: Patent Calf veins: Evaluation of the posterior tibial and peroneal veins within the calf demonstrate no evidence for venous thrombosis.  Complex right popliteal cyst with debris and wall thickening measuring 4.9 x 1.9 x 4.7 cm.  IMPRESSION:  1. No DVT of the right and left lower extremity. 2. Large complex right popliteal cyst measuring up to 4.9 cm.  Thank you for the opportunity to participate in the care of this patient.     LISHA MUHAMMAD MD  - Electronically Signed: 03- 10:55 AM  Physician to Physician Direct Line is: [phone]

## 2024-03-17 NOTE — HISTORY OF PRESENT ILLNESS
[de-identified] : Thai Granados is a 69-year-old male who presented to the office for evaluation of his right knee popliteal cyst.  Patient underwent right TKA in 2005 and revision in 2009, which did well.  He underwent a recent Doppler due to swelling in the legs, which showed a right popliteal cyst.  There was no DVT.  He can have occasional right knee pain.  Pain is located over the anterior knee.  No tibial pain.  Patient underwent left TKA in 2005, which was complicated by multiple revisions, last in 2019 by Dr. Guadarrama at [facility] (distal femoral placement, possible mesh reconstruction).  Patient continues to have left knee pain.  He has pain with standing.  Patient needs a wheelchair at home.  He is unable to straight leg raise.  He has a history of an infection, but is not on antibiotics at this time.  History: HTN, Pancreas issues (on Creon)

## 2024-03-17 NOTE — REVIEW OF SYSTEMS
[Joint Stiffness] : no joint stiffness [Joint Pain] : joint pain [Joint Swelling] : no joint swelling [Fever] : no fever [Chills] : no chills [Negative] : Endocrine

## 2024-04-11 ENCOUNTER — APPOINTMENT (OUTPATIENT)
Dept: ORTHOPEDIC SURGERY | Facility: CLINIC | Age: 69
End: 2024-04-11
Payer: MEDICARE

## 2024-04-11 DIAGNOSIS — Z96.652 PRESENCE OF LEFT ARTIFICIAL KNEE JOINT: ICD-10-CM

## 2024-04-11 DIAGNOSIS — M25.362 OTHER INSTABILITY, LEFT KNEE: ICD-10-CM

## 2024-04-11 PROCEDURE — 99213 OFFICE O/P EST LOW 20 MIN: CPT

## 2024-04-18 NOTE — HISTORY OF PRESENT ILLNESS
[Left Leg] : left leg [7] : 7 [Burning] : burning [Radiating] : radiating [Sharp] : sharp [Shooting] : shooting [Stabbing] : stabbing [Throbbing] : throbbing [Tingling] : tingling [Constant] : constant [Household chores] : household chores [Leisure] : leisure [Sleep] : sleep [Social interactions] : social interactions [Meds] : meds [Injection therapy] : injection therapy [Nothing helps with pain getting better] : Nothing helps with pain getting better [Sitting] : sitting [Standing] : standing [Walking] : walking [] : This patient has had an injection before: yes [FreeTextEntry1] : LT knee  [FreeTextEntry6] : numbness  [FreeTextEntry9] : oxy

## 2024-04-19 ENCOUNTER — APPOINTMENT (OUTPATIENT)
Dept: PAIN MANAGEMENT | Facility: CLINIC | Age: 69
End: 2024-04-19
Payer: MEDICARE

## 2024-04-19 VITALS — HEIGHT: 74 IN | WEIGHT: 200 LBS | BODY MASS INDEX: 25.67 KG/M2

## 2024-04-19 DIAGNOSIS — M25.562 PAIN IN LEFT KNEE: ICD-10-CM

## 2024-04-19 DIAGNOSIS — G89.29 PAIN IN LEFT KNEE: ICD-10-CM

## 2024-04-19 PROCEDURE — 99213 OFFICE O/P EST LOW 20 MIN: CPT

## 2024-04-19 RX ORDER — TRAMADOL HYDROCHLORIDE 50 MG/1
50 TABLET, COATED ORAL
Qty: 120 | Refills: 0 | Status: ACTIVE | COMMUNITY
Start: 2023-10-20 | End: 1900-01-01

## 2024-04-19 RX ORDER — TRAMADOL HYDROCHLORIDE 200 MG/1
200 TABLET, EXTENDED RELEASE ORAL
Qty: 30 | Refills: 0 | Status: ACTIVE | COMMUNITY
Start: 2024-04-19 | End: 1900-01-01

## 2024-04-20 NOTE — DISCUSSION/SUMMARY
[de-identified] : Thai Granados is a 69-year-old male who presented to the office for follow up of his left knee. Patient continues to have left knee pain and instability. Prior Left knee x-rays showed a distal femoral replacement with lucencies around the cement mantle of the femur.  Discussed with the patient the examination and imaging findings.  Discussed management of patient's left total knee arthroplasty at this time.  Patient was given a prescription for a drop lock brace.  Patient would like to consider operative management.  Discussed that any operative management would consist of potential total femur replacement versus amputation. Patient would like to consider options, including amputation.  Patient will follow-up in 2 to 3 months for reevaluation and management.  Patient understanding and in agreement with the plan.  All questions answered.  Plan: -Drop Lock Brace Left Lower Extremity -Follow up in 2 to 3 months for reevaluation and management

## 2024-04-20 NOTE — PHYSICAL EXAM
[de-identified] : Constitutional:  69 year old male, alert and oriented, cooperative, in no acute distress.  HEENT  NC/AT.  Appearance: symmetric  Chest/Respiratory  Respiratory effort: no intercostal retractions or use of accessory muscles. Nonlabored Breathing  Mental Status:  Judgment, insight: intact Orientation: oriented to time, place, and person  Left Knee  Inspection:     Incision well healed. No erythema or drainage     No Effusion     No tenderness over the knee. No tenderness over the femur. No pain with hip ROM      Unable to SLR, No quadriceps function  Range of Motion: 	Extension - -25 degrees 	Flexion - 95 degrees 	Extensor lag: None  Stability:      Demonstrates no Varus or Valgus instability      Negative Anterior or Posterior drawer.      No midflexion instability  Patella: stable, tracks well.   Right Knee  Inspection:     Incision well healed. No erythema or drainage     No Effusion     Non-tender to palpation over tibial tubercle, patella, medial and lateral joint line, and pes insertion. No tenderness over the tibia  Range of Motion: 	Extension - 0 degrees 	Flexion - 120 degrees 	Extensor lag: None  Stability:      Demonstrates no Varus or Valgus instability      Negative Anterior or Posterior drawer.      No midflexion instability  Patella: stable, tracks well.   Neurologic Exam     Motor intact including 5/5 Extensor Hallucis Longus, 5/5 Flexor Hallucis Longus, 5/5 Tibialis Anterior and 5/5 Gastrocnemius     Sensation Intact to Light Touch including Saphenous, Sural, Superficial Peroneal, Deep Peroneal, Tibial nerve distributions  Vascular Exam     Foot is warm and well perfused with 2+ Dorsalis Pedis Pulse   No pain with range of motion of the bilateral hips. No lumbar paraspinal muscle tenderness. [de-identified] : XRay:  XRays of the Pelvis (1 View) taken on 3/14/2024. XRays demonstrate no obvious fracture or dislocation. There is no significant evidence of osteoarthritis or osteophyte formation. Proximal aspect of left distal femur replacement is partially visualized. (my personal interpretation).   XRay:  XRays of the Right Knee (3 Views) taken on 3/14/2024. XRays demonstrate a Right Total Knee Arthroplasty. There are tibial and femoral stemmed components. The tibial stem has migrated laterally with cortical thinning and remodeling on the lateral tibia. (my personal interpretation)  XRay:  XRays of the Left Knee (3 Views) taken on 3/14/2024. XRays demonstrate a Left Distal femur replacement. There are lucencies around the cement mantle of the femur. There is cortical thinning over the medial aspect of the femur.    EXAM: BILATERAL LOWER EXTREMITY DUPLEX ULTRASOUND  HISTORY:  Left knee pain and swelling for 3 weeks. Recent bilateral knee replacement  TECHNIQUE: Grayscale, doppler spectral analysis, and color flow doppler imaging  were performed from the level of the common femoral veins  to the popliteal veins bilaterally. Augmentation and compression were utilized as well.  Segmental evaluation of the calf veins was performed.   COMPARISON: 11/15/2017  FINDINGS:   RIGHT:  Common femoral vein: Patent Femoral vein: Patent Popliteal Vein: Patent Calf veins: Evaluation of the posterior tibial and peroneal veins within the calf demonstrate no evidence for venous thrombosis.  LEFT:  Common femoral vein: Patent Femoral vein: Patent Popliteal Vein: Patent Calf veins: Evaluation of the posterior tibial and peroneal veins within the calf demonstrate no evidence for venous thrombosis.  Complex right popliteal cyst with debris and wall thickening measuring 4.9 x 1.9 x 4.7 cm.  IMPRESSION:  1. No DVT of the right and left lower extremity. 2. Large complex right popliteal cyst measuring up to 4.9 cm.  Thank you for the opportunity to participate in the care of this patient.     LISHA MUHAMMAD MD  - Electronically Signed: 03- 10:55 AM  Physician to Physician Direct Line is: [phone]

## 2024-04-20 NOTE — HISTORY OF PRESENT ILLNESS
[de-identified] : 4/11/2024  Thai Granados presented to the office for follow up of his left knee pain. Patient was unable to get his brace. ESR: 2 mm/hr, CRP: 0.5 mg/L. He has been walking with crutches. His right knee is doing ok. No falls. No fevers or chills.   3/14/2024 Thai Granados is a 69-year-old male who presented to the office for evaluation of his right knee popliteal cyst.  Patient underwent right TKA in 2005 and revision in 2009, which did well.  He underwent a recent Doppler due to swelling in the legs, which showed a right popliteal cyst.  There was no DVT.  He can have occasional right knee pain.  Pain is located over the anterior knee.  No tibial pain.  Patient underwent left TKA in 2005, which was complicated by multiple revisions, last in 2019 by Dr. Guadarrama at [facility] (distal femoral placement, possible mesh reconstruction).  Patient continues to have left knee pain.  He has pain with standing.  Patient needs a wheelchair at home.  He is unable to straight leg raise.  He has a history of an infection, but is not on antibiotics at this time.  History: HTN, Pancreas issues (on Creon)

## 2024-06-02 ENCOUNTER — EMERGENCY (EMERGENCY)
Facility: HOSPITAL | Age: 69
LOS: 0 days | Discharge: HOME | End: 2024-06-02
Attending: EMERGENCY MEDICINE
Payer: MEDICARE

## 2024-06-02 VITALS
RESPIRATION RATE: 18 BRPM | SYSTOLIC BLOOD PRESSURE: 131 MMHG | WEIGHT: 190.04 LBS | HEIGHT: 74 IN | DIASTOLIC BLOOD PRESSURE: 73 MMHG | OXYGEN SATURATION: 97 % | TEMPERATURE: 100 F | HEART RATE: 102 BPM

## 2024-06-02 VITALS
OXYGEN SATURATION: 98 % | SYSTOLIC BLOOD PRESSURE: 137 MMHG | RESPIRATION RATE: 18 BRPM | DIASTOLIC BLOOD PRESSURE: 84 MMHG | TEMPERATURE: 98 F | HEART RATE: 88 BPM

## 2024-06-02 DIAGNOSIS — R00.2 PALPITATIONS: ICD-10-CM

## 2024-06-02 DIAGNOSIS — Z96.653 PRESENCE OF ARTIFICIAL KNEE JOINT, BILATERAL: Chronic | ICD-10-CM

## 2024-06-02 DIAGNOSIS — I10 ESSENTIAL (PRIMARY) HYPERTENSION: ICD-10-CM

## 2024-06-02 LAB
ALBUMIN SERPL ELPH-MCNC: 3.8 G/DL — SIGNIFICANT CHANGE UP (ref 3.3–5)
ALP SERPL-CCNC: 118 U/L — SIGNIFICANT CHANGE UP (ref 40–120)
ALT FLD-CCNC: 33 U/L — SIGNIFICANT CHANGE UP (ref 12–78)
ANION GAP SERPL CALC-SCNC: 3 MMOL/L — LOW (ref 5–17)
APTT BLD: 32.8 SEC — SIGNIFICANT CHANGE UP (ref 24.5–35.6)
AST SERPL-CCNC: 23 U/L — SIGNIFICANT CHANGE UP (ref 15–37)
BASOPHILS # BLD AUTO: 0.02 K/UL — SIGNIFICANT CHANGE UP (ref 0–0.2)
BASOPHILS NFR BLD AUTO: 0.2 % — SIGNIFICANT CHANGE UP (ref 0–2)
BILIRUB SERPL-MCNC: 1.4 MG/DL — HIGH (ref 0.2–1.2)
BUN SERPL-MCNC: 12 MG/DL — SIGNIFICANT CHANGE UP (ref 7–23)
CALCIUM SERPL-MCNC: 9.4 MG/DL — SIGNIFICANT CHANGE UP (ref 8.5–10.1)
CHLORIDE SERPL-SCNC: 108 MMOL/L — SIGNIFICANT CHANGE UP (ref 96–108)
CO2 SERPL-SCNC: 30 MMOL/L — SIGNIFICANT CHANGE UP (ref 22–31)
CREAT SERPL-MCNC: 1.12 MG/DL — SIGNIFICANT CHANGE UP (ref 0.5–1.3)
EGFR: 71 ML/MIN/1.73M2 — SIGNIFICANT CHANGE UP
EOSINOPHIL # BLD AUTO: 0.09 K/UL — SIGNIFICANT CHANGE UP (ref 0–0.5)
EOSINOPHIL NFR BLD AUTO: 0.7 % — SIGNIFICANT CHANGE UP (ref 0–6)
GLUCOSE SERPL-MCNC: 100 MG/DL — HIGH (ref 70–99)
HCT VFR BLD CALC: 46.6 % — SIGNIFICANT CHANGE UP (ref 39–50)
HGB BLD-MCNC: 15 G/DL — SIGNIFICANT CHANGE UP (ref 13–17)
IMM GRANULOCYTES NFR BLD AUTO: 0.5 % — SIGNIFICANT CHANGE UP (ref 0–0.9)
INR BLD: 0.97 RATIO — SIGNIFICANT CHANGE UP (ref 0.85–1.18)
LIDOCAIN IGE QN: 39 U/L — SIGNIFICANT CHANGE UP (ref 13–75)
LYMPHOCYTES # BLD AUTO: 1.45 K/UL — SIGNIFICANT CHANGE UP (ref 1–3.3)
LYMPHOCYTES # BLD AUTO: 12 % — LOW (ref 13–44)
MAGNESIUM SERPL-MCNC: 2.1 MG/DL — SIGNIFICANT CHANGE UP (ref 1.6–2.6)
MCHC RBC-ENTMCNC: 23.3 PG — LOW (ref 27–34)
MCHC RBC-ENTMCNC: 32.2 G/DL — SIGNIFICANT CHANGE UP (ref 32–36)
MCV RBC AUTO: 72.4 FL — LOW (ref 80–100)
MONOCYTES # BLD AUTO: 1.17 K/UL — HIGH (ref 0–0.9)
MONOCYTES NFR BLD AUTO: 9.7 % — SIGNIFICANT CHANGE UP (ref 2–14)
NEUTROPHILS # BLD AUTO: 9.33 K/UL — HIGH (ref 1.8–7.4)
NEUTROPHILS NFR BLD AUTO: 76.9 % — SIGNIFICANT CHANGE UP (ref 43–77)
NRBC # BLD: 0 /100 WBCS — SIGNIFICANT CHANGE UP (ref 0–0)
PLATELET # BLD AUTO: 248 K/UL — SIGNIFICANT CHANGE UP (ref 150–400)
POTASSIUM SERPL-MCNC: 4.2 MMOL/L — SIGNIFICANT CHANGE UP (ref 3.5–5.3)
POTASSIUM SERPL-SCNC: 4.2 MMOL/L — SIGNIFICANT CHANGE UP (ref 3.5–5.3)
PROT SERPL-MCNC: 7.1 GM/DL — SIGNIFICANT CHANGE UP (ref 6–8.3)
PROTHROM AB SERPL-ACNC: 11.6 SEC — SIGNIFICANT CHANGE UP (ref 9.5–13)
RBC # BLD: 6.44 M/UL — HIGH (ref 4.2–5.8)
RBC # FLD: 16.6 % — HIGH (ref 10.3–14.5)
SODIUM SERPL-SCNC: 141 MMOL/L — SIGNIFICANT CHANGE UP (ref 135–145)
TROPONIN I, HIGH SENSITIVITY RESULT: 23.1 NG/L — SIGNIFICANT CHANGE UP
WBC # BLD: 12.12 K/UL — HIGH (ref 3.8–10.5)
WBC # FLD AUTO: 12.12 K/UL — HIGH (ref 3.8–10.5)

## 2024-06-02 PROCEDURE — 99285 EMERGENCY DEPT VISIT HI MDM: CPT

## 2024-06-02 PROCEDURE — 93010 ELECTROCARDIOGRAM REPORT: CPT

## 2024-06-02 PROCEDURE — 71046 X-RAY EXAM CHEST 2 VIEWS: CPT | Mod: 26

## 2024-06-02 NOTE — ED ADULT TRIAGE NOTE - NSSEPSISSUSPECTED_ED_A_ED
[facility] Emergency Department    [address] [facility] [address] [address]    Phone:  [phone]    Fax:  [phone]                                       Kumar Cole   MRN: [id number]    Department:  [facility] Emergency Department   Date of Visit:  7/11/2017           After Visit Summary Signature Page     I have received my discharge instructions, and my questions have been answered. I have discussed any challenges I see with this plan with the nurse or doctor.    ..........................................................................................................................................  Patient/Patient Representative Signature      ..........................................................................................................................................  Patient Representative Print Name and Relationship to Patient    ..................................................               ................................................  Date                                            Time    ..........................................................................................................................................  Reviewed by Signature/Title    ...................................................              ..............................................  Date                                                            Time           No

## 2024-06-02 NOTE — ED PROVIDER NOTE - CARDIOVASCULAR [-], MLM
----- Message from Radha Rosa sent at 2/21/2020 11:38 AM EST -----  Regarding: Dr. Matteo Wilder: [phone]  [facility] (if not patient): Ora Lopes N/A  Reason for call: Ora Lopes She would like to know what medication was prescribed on 02/20 and what pharmacy was it called into. Callback required yes/no and why: Yes. Best contact number(s): [phone]  Details to clarify the request: Pt usually uses [address] but they have not received a Rx.
no chest pain/no peripheral edema/no syncope

## 2024-06-02 NOTE — ED PROVIDER NOTE - CARE PROVIDER_API CALL
Avelino Bates.  Internal Medicine  [address] [place] [address], [address] [place] [id number]  Phone: [phone]  Fax: [phone]  Established Patient  Follow Up Time: 7-10 Days

## 2024-06-02 NOTE — ED ADULT NURSE NOTE - OBJECTIVE STATEMENT
69yM A&Ox3 presenting to ED with palpitations. pt reports the palpitations started this morning and have not resolved completely. pt reports he recently was seen by a cardiologist and informed that "everything looked ok." pt has COPD, HTN and is med compliant. pt denies sob, dizziness, weakness, blurred vision, N+T, increased work of breathing, abd pain, N+V+D, back pain, h/a, recent fever/cough,  symptoms. pt is currently ambulating with crutches as he has LLE from COPD.

## 2024-06-02 NOTE — ED PROVIDER NOTE - OBJECTIVE STATEMENT
The patient reports he has been having painless palpatations since this morning and noted his HR was as high as 110. Denies pain nor anxiety.

## 2024-06-02 NOTE — ED PROVIDER NOTE - CHPI ED SYMPTOMS NEG
no back pain/no chest pain/no cough/no fever/no nausea/no shortness of breath/no syncope/no vomiting

## 2024-06-02 NOTE — ED PROVIDER NOTE - NSFOLLOWUPINSTRUCTIONS_ED_ALL_ED_FT
Your blood work, ecg, and chest x-ray did not reveal the cause of your symptoms, but emergent medical conditions were ruled out. Please follow up with your cardiologist within 1 week. Return to the ER if you pass out, develop chest pain, or any other concerning symptoms.

## 2024-06-02 NOTE — ED PROVIDER NOTE - PATIENT PORTAL LINK FT
You can access the FollowMyHealth Patient Portal offered by [facility] by registering at the following website: [email or web address]. By joining [facility]’s FollowMyHealth portal, you will also be able to view your health information using other applications (apps) compatible with our system.

## 2024-06-02 NOTE — ED PROVIDER NOTE - CLINICAL SUMMARY MEDICAL DECISION MAKING FREE TEXT BOX
69M c/o palpatations and noted tachycardia at home  -ecg, monitor  -cbc, cmp, coags, trop, tsh, lipase  -cxr

## 2024-07-18 ENCOUNTER — APPOINTMENT (OUTPATIENT)
Dept: ORTHOPEDIC SURGERY | Facility: CLINIC | Age: 69
End: 2024-07-18
Payer: MEDICARE

## 2024-07-18 DIAGNOSIS — Z96.659 PAIN DUE TO INTERNAL ORTHOPEDIC PROSTHETIC DEVICES, IMPLANTS AND GRAFTS, INITIAL ENCOUNTER: ICD-10-CM

## 2024-07-18 DIAGNOSIS — T84.84XA PAIN DUE TO INTERNAL ORTHOPEDIC PROSTHETIC DEVICES, IMPLANTS AND GRAFTS, INITIAL ENCOUNTER: ICD-10-CM

## 2024-07-18 PROCEDURE — 99213 OFFICE O/P EST LOW 20 MIN: CPT

## 2024-07-24 ENCOUNTER — APPOINTMENT (OUTPATIENT)
Dept: ORTHOPEDIC SURGERY | Facility: CLINIC | Age: 69
End: 2024-07-24
Payer: MEDICARE

## 2024-07-24 VITALS
SYSTOLIC BLOOD PRESSURE: 130 MMHG | HEART RATE: 76 BPM | WEIGHT: 190 LBS | HEIGHT: 74 IN | OXYGEN SATURATION: 88 % | BODY MASS INDEX: 24.38 KG/M2 | DIASTOLIC BLOOD PRESSURE: 64 MMHG

## 2024-07-24 PROCEDURE — 99214 OFFICE O/P EST MOD 30 MIN: CPT

## 2024-07-24 NOTE — DATA REVIEWED
[Imaging Present] : Present [de-identified] : X-rays of both femurs and knees reviewed from previous shows the right knee with a revision arthroplasty in position of the patella is very thin as a show.  The tibial stem is offset laterally and goes into a new remodeled neocortex in the proximal shaft.  The left knee is a hinge with the tibia cemented in position and no signs of loosening.  The femur is considerably loose around the cement mantle with very thin cortices and a cement plug proximally in the subtrochanteric area which seems loose as well.  The patella is very mobile and seems in between the tibia and femur possibly blocking extension.

## 2024-07-24 NOTE — PHYSICAL EXAM
[FreeTextEntry1] : On exam incision is clean dry and intact.  He has good range of motion of his hips.  His right knee goes from 0 to 110 degrees without problems.  His left knee has no active extension.  It goes from 20  to 100 degrees with some pain and swelling.  There is no obvious instability seen.  He is neurovascular intact distally.  He has good distal pulse [General Appearance - Well-Appearing] : Well appearing [General Appearance - Well Nourished] : well nourished

## 2024-07-24 NOTE — HISTORY OF PRESENT ILLNESS
[FreeTextEntry1] :  This is a 69-year-old with extensive orthopedic history.  He had bilateral knee replacements done and arthritis.  Both have been revised.  We are here for the left side because of some pain and stiffness.  The left knee was originally replaced in 2005 in [facility] and then was revised in 2007 for loosening site it was revised in 2011 and 12 eventually having another revision in March 2020 at [facility] with Dr. Guadarrama.  Since then he has had progressive problems with extension.  His knee does not actively extend and he notes that there was a problem with his kneecap that was removed.  He has been trying to get a brace but been unsuccessful so far.  He walks with crutches because his leg feels less stable.  Most of the pain is around the knee.  He says that he has been worked up for infection and fluid has been taken at times but nothing ever grew. [Worsening] : worsening [___ yrs] : [unfilled] year(s) ago [4] : currently ~his/her~ pain is 4 out of 10 [Bending] : worsened by bending [Direct Pressure] : worsened by direct pressure [None] : No relieving factors are noted

## 2024-07-24 NOTE — DISCUSSION/SUMMARY
[All Questions Answered] : Patient (and family) had all questions answered to an agreeable level of satisfaction [Interested in Proceeding] : Patient (and family) expressed understanding and interest in proceeding with the plan as outlined [de-identified] : Patient has significant problems in his left knee.  He is not seeming to have significant pain from his femur but mostly from his knee.  He is also mostly worried about his extension.  I have told him that any surgery we can do would involve either complete work on his femur including some resection possible impaction grafting with cementing into the impaction grafting with cables. in addition we would need to evaluate his extensor mechanism further issues are.  He may need a full allograft including medial patella with button which would be very difficult to heal both in his tibia as well as with his quadricep tendon/muscle.  He has very high chance of infection with the surgery and will need to be straight for at least 2 months and will limit his flexion.  Alternatively we can discuss a fusion.  All of these are high risks and may end up with an amputation.  Alternatively he can start working with a brace and see if this helps him.  I am available for limb salvage surgery as necessary.  If imaging or pathology/biopsy was ordered, the patient was told to make an appointment to review findings right after all imaging is completed.  We discussed risks, benefits and alternatives. Rationale of care was reviewed and all questions were answered. Patient (and family) had all questions answered to her degree of the level of satisfaction. Patient (and family) expressed understanding and interest in proceeding with the plan as outlined.     This note was done with a voice recognition transcription software and any typos are related to this rather than medical error. Surgical risks reviewed. Patient (and family) had all questions answered to an agreeable level of satisfaction. Patient (and family) expressed understanding and interest in proceeding with the plan as outlined.

## 2024-07-26 ENCOUNTER — APPOINTMENT (OUTPATIENT)
Dept: PAIN MANAGEMENT | Facility: CLINIC | Age: 69
End: 2024-07-26
Payer: MEDICARE

## 2024-07-26 VITALS — HEIGHT: 74 IN | BODY MASS INDEX: 24.38 KG/M2 | WEIGHT: 190 LBS

## 2024-07-26 DIAGNOSIS — G89.29 PAIN IN LEFT KNEE: ICD-10-CM

## 2024-07-26 DIAGNOSIS — M25.562 PAIN IN LEFT KNEE: ICD-10-CM

## 2024-07-26 PROCEDURE — 99214 OFFICE O/P EST MOD 30 MIN: CPT

## 2024-07-29 NOTE — DISCUSSION/SUMMARY
[de-identified] : Medication Renewal  The patient is stable on current pain medication with analgesia and without notable side effects or any obvious aberrant behaviors exhibited.  There is clinically meaningful improvement in pain and function that outweighs risks to patient safety. I have discussed risks and realistic benefits of opioid therapy and patient and clinician responsibilities for managing therapy. Pain agreement has been signed.  Will renew medication today.  Follow up in 4-6 weeks or sooner if there are any problems.  Conservative Care Continue Home exercises, stretching, activity modification, physical therapy, and conservative care.   NO NEW FINDINGS Patient denies new numbness, tingling, weakness, fevers, chills and bowel/bladder incontinence    I have consulted the  Registry for the purpose of reviewing the patient's controlled substance

## 2024-07-29 NOTE — HISTORY OF PRESENT ILLNESS
[Left Leg] : left leg [7] : 7 [Burning] : burning [Radiating] : radiating [Sharp] : sharp [Shooting] : shooting [Stabbing] : stabbing [Throbbing] : throbbing [Tingling] : tingling [Constant] : constant [Household chores] : household chores [Leisure] : leisure [Sleep] : sleep [Social interactions] : social interactions [Meds] : meds [Injection therapy] : injection therapy [Nothing helps with pain getting better] : Nothing helps with pain getting better [Sitting] : sitting [Standing] : standing [Walking] : walking [9] : 9 [8] : 8 [] : Patient is currently injured and not playing sports: no [FreeTextEntry1] : LT knee  [FreeTextEntry6] : numbness  [FreeTextEntry9] : oxy

## 2024-07-29 NOTE — DISCUSSION/SUMMARY
[de-identified] : Medication Renewal  The patient is stable on current pain medication with analgesia and without notable side effects or any obvious aberrant behaviors exhibited.  There is clinically meaningful improvement in pain and function that outweighs risks to patient safety. I have discussed risks and realistic benefits of opioid therapy and patient and clinician responsibilities for managing therapy. Pain agreement has been signed.  Will renew medication today.  Follow up in 4-6 weeks or sooner if there are any problems.  Conservative Care Continue Home exercises, stretching, activity modification, physical therapy, and conservative care.   NO NEW FINDINGS Patient denies new numbness, tingling, weakness, fevers, chills and bowel/bladder incontinence    I have consulted the  Registry for the purpose of reviewing the patient's controlled substance

## 2024-07-29 NOTE — DISCUSSION/SUMMARY
[de-identified] : Medication Renewal  The patient is stable on current pain medication with analgesia and without notable side effects or any obvious aberrant behaviors exhibited.  There is clinically meaningful improvement in pain and function that outweighs risks to patient safety. I have discussed risks and realistic benefits of opioid therapy and patient and clinician responsibilities for managing therapy. Pain agreement has been signed.  Will renew medication today.  Follow up in 4-6 weeks or sooner if there are any problems.  Conservative Care Continue Home exercises, stretching, activity modification, physical therapy, and conservative care.   NO NEW FINDINGS Patient denies new numbness, tingling, weakness, fevers, chills and bowel/bladder incontinence    I have consulted the  Registry for the purpose of reviewing the patient's controlled substance

## 2024-08-30 ENCOUNTER — APPOINTMENT (OUTPATIENT)
Dept: PAIN MANAGEMENT | Facility: CLINIC | Age: 69
End: 2024-08-30
Payer: MEDICARE

## 2024-08-30 VITALS — BODY MASS INDEX: 24.38 KG/M2 | HEIGHT: 74 IN | WEIGHT: 190 LBS

## 2024-08-30 DIAGNOSIS — G89.29 PAIN IN LEFT KNEE: ICD-10-CM

## 2024-08-30 DIAGNOSIS — Z96.89 PRESENCE OF OTHER SPECIFIED FUNCTIONAL IMPLANTS: ICD-10-CM

## 2024-08-30 DIAGNOSIS — M25.562 PAIN IN LEFT KNEE: ICD-10-CM

## 2024-08-30 PROCEDURE — 99213 OFFICE O/P EST LOW 20 MIN: CPT

## 2024-08-30 NOTE — HISTORY OF PRESENT ILLNESS
[Left Leg] : left leg [Burning] : burning [Radiating] : radiating [Sharp] : sharp [Shooting] : shooting [Stabbing] : stabbing [Throbbing] : throbbing [Tingling] : tingling [Constant] : constant [Household chores] : household chores [Leisure] : leisure [Sleep] : sleep [Social interactions] : social interactions [Meds] : meds [Injection therapy] : injection therapy [Nothing helps with pain getting better] : Nothing helps with pain getting better [Sitting] : sitting [Standing] : standing [Walking] : walking [8] : 8 [7] : 7 [] : Patient is currently injured and not playing sports: no [FreeTextEntry1] : LT knee  [FreeTextEntry6] : numbness  [FreeTextEntry9] : oxy

## 2024-09-27 ENCOUNTER — APPOINTMENT (OUTPATIENT)
Dept: PAIN MANAGEMENT | Facility: CLINIC | Age: 69
End: 2024-09-27
Payer: MEDICARE

## 2024-09-27 VITALS — HEIGHT: 74 IN | WEIGHT: 190 LBS | BODY MASS INDEX: 24.38 KG/M2

## 2024-09-27 DIAGNOSIS — G89.29 PAIN IN LEFT KNEE: ICD-10-CM

## 2024-09-27 DIAGNOSIS — M25.562 PAIN IN LEFT KNEE: ICD-10-CM

## 2024-09-27 PROCEDURE — 99214 OFFICE O/P EST MOD 30 MIN: CPT

## 2024-09-27 NOTE — DISCUSSION/SUMMARY
[de-identified] : Medication Renewal  The patient is stable on current pain medication with analgesia and without notable side effects or any obvious aberrant behaviors exhibited.  There is clinically meaningful improvement in pain and function that outweighs risks to patient safety. I have discussed risks and realistic benefits of opioid therapy and patient and clinician responsibilities for managing therapy. Pain agreement has been signed.  Will renew medication today.  Follow up in 4-6 weeks or sooner if there are any problems.  Conservative Care Continue Home exercises, stretching, activity modification, physical therapy, and conservative care.   NO NEW FINDINGS Patient denies new numbness, tingling, weakness, fevers, chills and bowel/bladder incontinence    I have consulted the  Registry for the purpose of reviewing the patient's controlled substance   UTOX DONE- last dose yesterday

## 2024-09-27 NOTE — HISTORY OF PRESENT ILLNESS
[Left Leg] : left leg [7] : 7 [Burning] : burning [Radiating] : radiating [Sharp] : sharp [Shooting] : shooting [Stabbing] : stabbing [Throbbing] : throbbing [Tingling] : tingling [Constant] : constant [Household chores] : household chores [Leisure] : leisure [Sleep] : sleep [Social interactions] : social interactions [Meds] : meds [Injection therapy] : injection therapy [Nothing helps with pain getting better] : Nothing helps with pain getting better [Sitting] : sitting [Standing] : standing [Walking] : walking [8] : 8 [] : Patient is currently injured and not playing sports: no [FreeTextEntry1] : LT knee  [FreeTextEntry6] : numbness  [FreeTextEntry9] : oxy  [TextEntry] : patient is following up for left leg pain , medication refill

## 2024-09-27 NOTE — DISCUSSION/SUMMARY
[de-identified] : Medication Renewal  The patient is stable on current pain medication with analgesia and without notable side effects or any obvious aberrant behaviors exhibited.  There is clinically meaningful improvement in pain and function that outweighs risks to patient safety. I have discussed risks and realistic benefits of opioid therapy and patient and clinician responsibilities for managing therapy. Pain agreement has been signed.  Will renew medication today.  Follow up in 4-6 weeks or sooner if there are any problems.  Conservative Care Continue Home exercises, stretching, activity modification, physical therapy, and conservative care.   NO NEW FINDINGS Patient denies new numbness, tingling, weakness, fevers, chills and bowel/bladder incontinence    I have consulted the  Registry for the purpose of reviewing the patient's controlled substance   UTOX DONE- last dose yesterday

## 2024-10-24 ENCOUNTER — APPOINTMENT (OUTPATIENT)
Dept: ORTHOPEDIC SURGERY | Facility: CLINIC | Age: 69
End: 2024-10-24